# Patient Record
Sex: FEMALE | Race: WHITE | NOT HISPANIC OR LATINO | Employment: OTHER | URBAN - METROPOLITAN AREA
[De-identification: names, ages, dates, MRNs, and addresses within clinical notes are randomized per-mention and may not be internally consistent; named-entity substitution may affect disease eponyms.]

---

## 2019-12-29 ENCOUNTER — APPOINTMENT (EMERGENCY)
Dept: RADIOLOGY | Facility: HOSPITAL | Age: 84
DRG: 872 | End: 2019-12-29
Payer: MEDICARE

## 2019-12-29 ENCOUNTER — HOSPITAL ENCOUNTER (INPATIENT)
Facility: HOSPITAL | Age: 84
LOS: 9 days | Discharge: NURSING, REHAB OR LONG-TERM CARE | DRG: 872 | End: 2020-01-07
Attending: SURGERY | Admitting: HOSPITALIST
Payer: MEDICARE

## 2019-12-29 DIAGNOSIS — F02.80 LATE ONSET ALZHEIMER DISEASE (HCC): Chronic | ICD-10-CM

## 2019-12-29 DIAGNOSIS — R78.81 BACTEREMIA: ICD-10-CM

## 2019-12-29 DIAGNOSIS — R65.20 SEVERE SEPSIS (HCC): ICD-10-CM
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
## 2019-12-29 DIAGNOSIS — L21.9 SEBORRHEIC DERMATITIS OF SCALP: ICD-10-CM

## 2019-12-29 DIAGNOSIS — E44.0 PROTEIN-CALORIE MALNUTRITION, MODERATE (HCC): ICD-10-CM

## 2019-12-29 DIAGNOSIS — A41.9 SEVERE SEPSIS (HCC): ICD-10-CM

## 2019-12-29 DIAGNOSIS — E73.9 LACTOSE INTOLERANCE: Chronic | ICD-10-CM

## 2019-12-29 DIAGNOSIS — N30.00 ACUTE CYSTITIS WITHOUT HEMATURIA: Primary | ICD-10-CM

## 2019-12-29 DIAGNOSIS — N39.0 UTI (URINARY TRACT INFECTION): ICD-10-CM

## 2019-12-29 DIAGNOSIS — I10 ESSENTIAL HYPERTENSION: ICD-10-CM

## 2019-12-29 DIAGNOSIS — S31.809A WOUND OF BUTTOCK: ICD-10-CM

## 2019-12-29 DIAGNOSIS — G30.1 LATE ONSET ALZHEIMER DISEASE (HCC): Chronic | ICD-10-CM

## 2019-12-29 PROBLEM — E87.3 RESPIRATORY ALKALOSIS: Status: ACTIVE | Noted: 2019-12-29

## 2019-12-29 PROBLEM — R79.89 ELEVATED LACTIC ACID LEVEL: Status: ACTIVE | Noted: 2019-12-29

## 2019-12-29 PROBLEM — E86.0 DEHYDRATION: Status: ACTIVE | Noted: 2019-12-29

## 2019-12-29 PROBLEM — F03.90 DEMENTIA (HCC): Status: ACTIVE | Noted: 2019-12-29

## 2019-12-29 LAB
ALBUMIN SERPL BCP-MCNC: 3 G/DL (ref 3.5–5)
ALP SERPL-CCNC: 72 U/L (ref 46–116)
ALT SERPL W P-5'-P-CCNC: 20 U/L (ref 12–78)
ANION GAP SERPL CALCULATED.3IONS-SCNC: 6 MMOL/L (ref 4–13)
APTT PPP: 26 SECONDS (ref 23–37)
AST SERPL W P-5'-P-CCNC: 30 U/L (ref 5–45)
BACTERIA UR QL AUTO: ABNORMAL /HPF
BASE EXCESS BLDA CALC-SCNC: 1 MMOL/L (ref -2–3)
BASOPHILS # BLD AUTO: 0.04 THOUSANDS/ΜL (ref 0–0.1)
BASOPHILS NFR BLD AUTO: 1 % (ref 0–1)
BILIRUB SERPL-MCNC: 0.48 MG/DL (ref 0.2–1)
BILIRUB UR QL STRIP: NEGATIVE
BUN SERPL-MCNC: 28 MG/DL (ref 5–25)
CA-I BLD-SCNC: 1.23 MMOL/L (ref 1.12–1.32)
CALCIUM SERPL-MCNC: 9.7 MG/DL (ref 8.3–10.1)
CHLORIDE SERPL-SCNC: 105 MMOL/L (ref 100–108)
CLARITY UR: ABNORMAL
CO2 SERPL-SCNC: 26 MMOL/L (ref 21–32)
COLOR UR: YELLOW
CREAT SERPL-MCNC: 1.13 MG/DL (ref 0.6–1.3)
EOSINOPHIL # BLD AUTO: 0.36 THOUSAND/ΜL (ref 0–0.61)
EOSINOPHIL NFR BLD AUTO: 5 % (ref 0–6)
ERYTHROCYTE [DISTWIDTH] IN BLOOD BY AUTOMATED COUNT: 16.2 % (ref 11.6–15.1)
GFR SERPL CREATININE-BSD FRML MDRD: 42 ML/MIN/1.73SQ M
GLUCOSE SERPL-MCNC: 138 MG/DL (ref 65–140)
GLUCOSE SERPL-MCNC: 140 MG/DL (ref 65–140)
GLUCOSE UR STRIP-MCNC: NEGATIVE MG/DL
HCO3 BLDA-SCNC: 25.3 MMOL/L (ref 24–30)
HCT VFR BLD AUTO: 32.7 % (ref 34.8–46.1)
HCT VFR BLD CALC: 31 % (ref 34.8–46.1)
HGB BLD-MCNC: 10.3 G/DL (ref 11.5–15.4)
HGB BLDA-MCNC: 10.5 G/DL (ref 11.5–15.4)
HGB UR QL STRIP.AUTO: ABNORMAL
IMM GRANULOCYTES # BLD AUTO: 0.03 THOUSAND/UL (ref 0–0.2)
IMM GRANULOCYTES NFR BLD AUTO: 0 % (ref 0–2)
INR PPP: 1.04 (ref 0.84–1.19)
KETONES UR STRIP-MCNC: NEGATIVE MG/DL
LACTATE SERPL-SCNC: 1.8 MMOL/L (ref 0.5–2)
LACTATE SERPL-SCNC: 2.4 MMOL/L (ref 0.5–2)
LEUKOCYTE ESTERASE UR QL STRIP: ABNORMAL
LYMPHOCYTES # BLD AUTO: 0.87 THOUSANDS/ΜL (ref 0.6–4.47)
LYMPHOCYTES NFR BLD AUTO: 11 % (ref 14–44)
MCH RBC QN AUTO: 28 PG (ref 26.8–34.3)
MCHC RBC AUTO-ENTMCNC: 31.5 G/DL (ref 31.4–37.4)
MCV RBC AUTO: 89 FL (ref 82–98)
MONOCYTES # BLD AUTO: 0.43 THOUSAND/ΜL (ref 0.17–1.22)
MONOCYTES NFR BLD AUTO: 5 % (ref 4–12)
NEUTROPHILS # BLD AUTO: 6.17 THOUSANDS/ΜL (ref 1.85–7.62)
NEUTS SEG NFR BLD AUTO: 78 % (ref 43–75)
NITRITE UR QL STRIP: POSITIVE
NON-SQ EPI CELLS URNS QL MICRO: ABNORMAL /HPF
NRBC BLD AUTO-RTO: 0 /100 WBCS
PCO2 BLD: 26 MMOL/L (ref 21–32)
PCO2 BLD: 37.8 MM HG (ref 42–50)
PH BLD: 7.43 [PH] (ref 7.3–7.4)
PH UR STRIP.AUTO: 6.5 [PH]
PLATELET # BLD AUTO: 150 THOUSANDS/UL (ref 149–390)
PMV BLD AUTO: 10.6 FL (ref 8.9–12.7)
PO2 BLD: 26 MM HG (ref 35–45)
POTASSIUM BLD-SCNC: 4 MMOL/L (ref 3.5–5.3)
POTASSIUM SERPL-SCNC: 3.9 MMOL/L (ref 3.5–5.3)
PROCALCITONIN SERPL-MCNC: <0.05 NG/ML
PROT SERPL-MCNC: 7.8 G/DL (ref 6.4–8.2)
PROT UR STRIP-MCNC: ABNORMAL MG/DL
PROTHROMBIN TIME: 13.2 SECONDS (ref 11.6–14.5)
RBC # BLD AUTO: 3.68 MILLION/UL (ref 3.81–5.12)
RBC #/AREA URNS AUTO: ABNORMAL /HPF
SAO2 % BLD FROM PO2: 50 % (ref 60–85)
SODIUM BLD-SCNC: 138 MMOL/L (ref 136–145)
SODIUM SERPL-SCNC: 137 MMOL/L (ref 136–145)
SP GR UR STRIP.AUTO: 1.02 (ref 1–1.03)
SPECIMEN SOURCE: ABNORMAL
UROBILINOGEN UR QL STRIP.AUTO: 0.2 E.U./DL
WBC # BLD AUTO: 7.9 THOUSAND/UL (ref 4.31–10.16)
WBC #/AREA URNS AUTO: ABNORMAL /HPF

## 2019-12-29 PROCEDURE — 36415 COLL VENOUS BLD VENIPUNCTURE: CPT | Performed by: EMERGENCY MEDICINE

## 2019-12-29 PROCEDURE — 85014 HEMATOCRIT: CPT

## 2019-12-29 PROCEDURE — 84145 PROCALCITONIN (PCT): CPT | Performed by: EMERGENCY MEDICINE

## 2019-12-29 PROCEDURE — 84132 ASSAY OF SERUM POTASSIUM: CPT

## 2019-12-29 PROCEDURE — 72125 CT NECK SPINE W/O DYE: CPT

## 2019-12-29 PROCEDURE — 87086 URINE CULTURE/COLONY COUNT: CPT | Performed by: EMERGENCY MEDICINE

## 2019-12-29 PROCEDURE — 87186 SC STD MICRODIL/AGAR DIL: CPT | Performed by: EMERGENCY MEDICINE

## 2019-12-29 PROCEDURE — 80053 COMPREHEN METABOLIC PANEL: CPT | Performed by: EMERGENCY MEDICINE

## 2019-12-29 PROCEDURE — 87631 RESP VIRUS 3-5 TARGETS: CPT | Performed by: HOSPITALIST

## 2019-12-29 PROCEDURE — 99223 1ST HOSP IP/OBS HIGH 75: CPT | Performed by: HOSPITALIST

## 2019-12-29 PROCEDURE — 85730 THROMBOPLASTIN TIME PARTIAL: CPT | Performed by: EMERGENCY MEDICINE

## 2019-12-29 PROCEDURE — 1124F ACP DISCUSS-NO DSCNMKR DOCD: CPT | Performed by: INTERNAL MEDICINE

## 2019-12-29 PROCEDURE — 82330 ASSAY OF CALCIUM: CPT

## 2019-12-29 PROCEDURE — 99222 1ST HOSP IP/OBS MODERATE 55: CPT | Performed by: SURGERY

## 2019-12-29 PROCEDURE — 87077 CULTURE AEROBIC IDENTIFY: CPT | Performed by: EMERGENCY MEDICINE

## 2019-12-29 PROCEDURE — 82803 BLOOD GASES ANY COMBINATION: CPT

## 2019-12-29 PROCEDURE — 96365 THER/PROPH/DIAG IV INF INIT: CPT

## 2019-12-29 PROCEDURE — 82947 ASSAY GLUCOSE BLOOD QUANT: CPT

## 2019-12-29 PROCEDURE — 70450 CT HEAD/BRAIN W/O DYE: CPT

## 2019-12-29 PROCEDURE — 87040 BLOOD CULTURE FOR BACTERIA: CPT | Performed by: EMERGENCY MEDICINE

## 2019-12-29 PROCEDURE — 99285 EMERGENCY DEPT VISIT HI MDM: CPT

## 2019-12-29 PROCEDURE — 85610 PROTHROMBIN TIME: CPT | Performed by: EMERGENCY MEDICINE

## 2019-12-29 PROCEDURE — 84295 ASSAY OF SERUM SODIUM: CPT

## 2019-12-29 PROCEDURE — 81001 URINALYSIS AUTO W/SCOPE: CPT | Performed by: EMERGENCY MEDICINE

## 2019-12-29 PROCEDURE — 85025 COMPLETE CBC W/AUTO DIFF WBC: CPT | Performed by: EMERGENCY MEDICINE

## 2019-12-29 PROCEDURE — 83605 ASSAY OF LACTIC ACID: CPT | Performed by: EMERGENCY MEDICINE

## 2019-12-29 RX ORDER — MULTIVITAMIN WITH IRON
1 TABLET ORAL DAILY
COMMUNITY

## 2019-12-29 RX ORDER — ATENOLOL 25 MG/1
25 TABLET ORAL DAILY
COMMUNITY

## 2019-12-29 RX ORDER — ASPIRIN 81 MG/1
81 TABLET, CHEWABLE ORAL DAILY
COMMUNITY

## 2019-12-29 RX ORDER — SODIUM CHLORIDE 9 MG/ML
3 INJECTION INTRAVENOUS AS NEEDED
Status: DISCONTINUED | OUTPATIENT
Start: 2019-12-29 | End: 2020-01-07 | Stop reason: HOSPADM

## 2019-12-29 RX ORDER — LOVASTATIN 20 MG/1
20 TABLET ORAL
COMMUNITY

## 2019-12-29 RX ORDER — LOPERAMIDE HYDROCHLORIDE 2 MG/1
2 CAPSULE ORAL 4 TIMES DAILY PRN
COMMUNITY

## 2019-12-29 RX ORDER — DOCUSATE SODIUM 100 MG/1
100 CAPSULE, LIQUID FILLED ORAL 2 TIMES DAILY
COMMUNITY
End: 2020-01-07 | Stop reason: HOSPADM

## 2019-12-29 RX ADMIN — CEFEPIME HYDROCHLORIDE 2000 MG: 2 INJECTION, POWDER, FOR SOLUTION INTRAVENOUS at 21:25

## 2019-12-29 RX ADMIN — SODIUM CHLORIDE 500 ML: 0.9 INJECTION, SOLUTION INTRAVENOUS at 21:23

## 2019-12-30 PROBLEM — E73.9 LACTOSE INTOLERANCE: Chronic | Status: ACTIVE | Noted: 2019-12-30

## 2019-12-30 PROBLEM — N39.0 URINARY TRACT INFECTION: Status: ACTIVE | Noted: 2019-12-30

## 2019-12-30 PROBLEM — I16.0 HYPERTENSIVE URGENCY: Status: ACTIVE | Noted: 2019-12-29

## 2019-12-30 LAB
ANION GAP SERPL CALCULATED.3IONS-SCNC: 7 MMOL/L (ref 4–13)
BUN SERPL-MCNC: 22 MG/DL (ref 5–25)
CALCIUM SERPL-MCNC: 8.9 MG/DL (ref 8.3–10.1)
CHLORIDE SERPL-SCNC: 112 MMOL/L (ref 100–108)
CO2 SERPL-SCNC: 22 MMOL/L (ref 21–32)
CREAT SERPL-MCNC: 0.78 MG/DL (ref 0.6–1.3)
ERYTHROCYTE [DISTWIDTH] IN BLOOD BY AUTOMATED COUNT: 16.4 % (ref 11.6–15.1)
FLUAV RNA NPH QL NAA+PROBE: NORMAL
FLUBV RNA NPH QL NAA+PROBE: NORMAL
GFR SERPL CREATININE-BSD FRML MDRD: 65 ML/MIN/1.73SQ M
GLUCOSE SERPL-MCNC: 95 MG/DL (ref 65–140)
HCT VFR BLD AUTO: 30.6 % (ref 34.8–46.1)
HGB BLD-MCNC: 9.9 G/DL (ref 11.5–15.4)
MCH RBC QN AUTO: 28.7 PG (ref 26.8–34.3)
MCHC RBC AUTO-ENTMCNC: 32.4 G/DL (ref 31.4–37.4)
MCV RBC AUTO: 89 FL (ref 82–98)
PLATELET # BLD AUTO: 136 THOUSANDS/UL (ref 149–390)
PMV BLD AUTO: 11 FL (ref 8.9–12.7)
POTASSIUM SERPL-SCNC: 3.6 MMOL/L (ref 3.5–5.3)
RBC # BLD AUTO: 3.45 MILLION/UL (ref 3.81–5.12)
RSV RNA NPH QL NAA+PROBE: NORMAL
SODIUM SERPL-SCNC: 141 MMOL/L (ref 136–145)
WBC # BLD AUTO: 6.8 THOUSAND/UL (ref 4.31–10.16)

## 2019-12-30 PROCEDURE — G8978 MOBILITY CURRENT STATUS: HCPCS

## 2019-12-30 PROCEDURE — G8979 MOBILITY GOAL STATUS: HCPCS

## 2019-12-30 PROCEDURE — 99231 SBSQ HOSP IP/OBS SF/LOW 25: CPT | Performed by: PHYSICIAN ASSISTANT

## 2019-12-30 PROCEDURE — 97163 PT EVAL HIGH COMPLEX 45 MIN: CPT

## 2019-12-30 PROCEDURE — G8987 SELF CARE CURRENT STATUS: HCPCS

## 2019-12-30 PROCEDURE — 99232 SBSQ HOSP IP/OBS MODERATE 35: CPT | Performed by: INTERNAL MEDICINE

## 2019-12-30 PROCEDURE — 85027 COMPLETE CBC AUTOMATED: CPT | Performed by: HOSPITALIST

## 2019-12-30 PROCEDURE — 80048 BASIC METABOLIC PNL TOTAL CA: CPT | Performed by: HOSPITALIST

## 2019-12-30 PROCEDURE — G8988 SELF CARE GOAL STATUS: HCPCS

## 2019-12-30 PROCEDURE — 97167 OT EVAL HIGH COMPLEX 60 MIN: CPT

## 2019-12-30 RX ORDER — ASPIRIN 81 MG/1
81 TABLET, CHEWABLE ORAL DAILY
Status: DISCONTINUED | OUTPATIENT
Start: 2019-12-30 | End: 2020-01-07 | Stop reason: HOSPADM

## 2019-12-30 RX ORDER — CHOLECALCIFEROL (VITAMIN D3) 125 MCG
9000 CAPSULE ORAL
Status: DISCONTINUED | OUTPATIENT
Start: 2019-12-30 | End: 2020-01-07 | Stop reason: HOSPADM

## 2019-12-30 RX ORDER — SODIUM CHLORIDE 9 MG/ML
75 INJECTION, SOLUTION INTRAVENOUS CONTINUOUS
Status: DISPENSED | OUTPATIENT
Start: 2019-12-30 | End: 2019-12-30

## 2019-12-30 RX ORDER — MELATONIN
1000 DAILY
Status: DISCONTINUED | OUTPATIENT
Start: 2019-12-30 | End: 2020-01-07 | Stop reason: HOSPADM

## 2019-12-30 RX ORDER — HYDROCHLOROTHIAZIDE 25 MG/1
25 TABLET ORAL DAILY
COMMUNITY
Start: 2019-11-21

## 2019-12-30 RX ORDER — TIMOLOL MALEATE 5 MG/ML
1 SOLUTION/ DROPS OPHTHALMIC 2 TIMES DAILY
Status: DISCONTINUED | OUTPATIENT
Start: 2019-12-30 | End: 2020-01-07 | Stop reason: HOSPADM

## 2019-12-30 RX ORDER — POTASSIUM CHLORIDE 750 MG/1
10 TABLET, EXTENDED RELEASE ORAL DAILY
Status: DISCONTINUED | OUTPATIENT
Start: 2019-12-30 | End: 2020-01-07 | Stop reason: HOSPADM

## 2019-12-30 RX ORDER — SODIUM CHLORIDE 9 MG/ML
100 INJECTION, SOLUTION INTRAVENOUS CONTINUOUS
Status: DISCONTINUED | OUTPATIENT
Start: 2019-12-30 | End: 2019-12-30

## 2019-12-30 RX ORDER — SODIUM CHLORIDE 9 MG/ML
75 INJECTION, SOLUTION INTRAVENOUS CONTINUOUS
Status: DISCONTINUED | OUTPATIENT
Start: 2019-12-30 | End: 2019-12-31

## 2019-12-30 RX ORDER — ACETAMINOPHEN 325 MG/1
650 TABLET ORAL EVERY 6 HOURS PRN
Status: DISCONTINUED | OUTPATIENT
Start: 2019-12-30 | End: 2020-01-07 | Stop reason: HOSPADM

## 2019-12-30 RX ORDER — CHOLECALCIFEROL (VITAMIN D3) 125 MCG
6000 CAPSULE ORAL
Status: DISCONTINUED | OUTPATIENT
Start: 2019-12-30 | End: 2019-12-30

## 2019-12-30 RX ORDER — POLYETHYLENE GLYCOL 3350 17 G/17G
17 POWDER, FOR SOLUTION ORAL DAILY PRN
Status: DISCONTINUED | OUTPATIENT
Start: 2019-12-30 | End: 2020-01-01

## 2019-12-30 RX ORDER — ONDANSETRON 2 MG/ML
4 INJECTION INTRAMUSCULAR; INTRAVENOUS EVERY 6 HOURS PRN
Status: DISCONTINUED | OUTPATIENT
Start: 2019-12-30 | End: 2020-01-07 | Stop reason: HOSPADM

## 2019-12-30 RX ORDER — ATENOLOL 25 MG/1
25 TABLET ORAL DAILY
Status: DISCONTINUED | OUTPATIENT
Start: 2019-12-30 | End: 2019-12-31

## 2019-12-30 RX ORDER — LOPERAMIDE HYDROCHLORIDE 2 MG/1
2 CAPSULE ORAL 4 TIMES DAILY PRN
Status: DISCONTINUED | OUTPATIENT
Start: 2019-12-30 | End: 2020-01-07 | Stop reason: HOSPADM

## 2019-12-30 RX ORDER — PRAVASTATIN SODIUM 20 MG
20 TABLET ORAL
Status: DISCONTINUED | OUTPATIENT
Start: 2019-12-30 | End: 2020-01-07 | Stop reason: HOSPADM

## 2019-12-30 RX ORDER — HYDRALAZINE HYDROCHLORIDE 20 MG/ML
5 INJECTION INTRAMUSCULAR; INTRAVENOUS EVERY 6 HOURS PRN
Status: DISPENSED | OUTPATIENT
Start: 2019-12-30 | End: 2019-12-31

## 2019-12-30 RX ORDER — SACCHAROMYCES BOULARDII 250 MG
250 CAPSULE ORAL 2 TIMES DAILY
Status: DISCONTINUED | OUTPATIENT
Start: 2019-12-30 | End: 2020-01-07 | Stop reason: HOSPADM

## 2019-12-30 RX ADMIN — HYDRALAZINE HYDROCHLORIDE 5 MG: 20 INJECTION INTRAMUSCULAR; INTRAVENOUS at 04:26

## 2019-12-30 RX ADMIN — ATENOLOL 25 MG: 25 TABLET ORAL at 08:06

## 2019-12-30 RX ADMIN — TIMOLOL MALEATE 1 DROP: 5 SOLUTION/ DROPS OPHTHALMIC at 08:07

## 2019-12-30 RX ADMIN — SODIUM CHLORIDE 100 ML/HR: 0.9 INJECTION, SOLUTION INTRAVENOUS at 04:26

## 2019-12-30 RX ADMIN — SODIUM CHLORIDE 75 ML/HR: 0.9 INJECTION, SOLUTION INTRAVENOUS at 17:07

## 2019-12-30 RX ADMIN — ZINC 1 TABLET: TAB ORAL at 18:06

## 2019-12-30 RX ADMIN — Medication 250 MG: at 18:06

## 2019-12-30 RX ADMIN — POTASSIUM CHLORIDE 10 MEQ: 750 TABLET, EXTENDED RELEASE ORAL at 08:06

## 2019-12-30 RX ADMIN — ACETAMINOPHEN 650 MG: 325 TABLET ORAL at 16:02

## 2019-12-30 RX ADMIN — SODIUM CHLORIDE 1000 ML: 0.9 INJECTION, SOLUTION INTRAVENOUS at 00:51

## 2019-12-30 RX ADMIN — CEFTRIAXONE 1000 MG: 1 INJECTION, POWDER, FOR SOLUTION INTRAMUSCULAR; INTRAVENOUS at 06:42

## 2019-12-30 RX ADMIN — LACTASE TAB 3000 UNIT 9000 UNITS: 3000 TAB at 18:06

## 2019-12-30 RX ADMIN — ASPIRIN 81 MG 81 MG: 81 TABLET ORAL at 08:06

## 2019-12-30 RX ADMIN — ENOXAPARIN SODIUM 30 MG: 30 INJECTION SUBCUTANEOUS at 08:07

## 2019-12-30 RX ADMIN — MELATONIN 1000 UNITS: at 08:06

## 2019-12-30 RX ADMIN — TIMOLOL MALEATE 1 DROP: 5 SOLUTION/ DROPS OPHTHALMIC at 18:06

## 2019-12-30 RX ADMIN — PRAVASTATIN SODIUM 20 MG: 20 TABLET ORAL at 18:06

## 2019-12-30 NOTE — PLAN OF CARE
Problem: OCCUPATIONAL THERAPY ADULT  Goal: Performs self-care activities at highest level of function for planned discharge setting  See evaluation for individualized goals  Description  Treatment Interventions: ADL retraining, Functional transfer training, Endurance training, Cognitive reorientation, Patient/family training, Compensatory technique education, Energy conservation, Activityengagement          See flowsheet documentation for full assessment, interventions and recommendations  Note:   Limitation: Decreased ADL status, Decreased UE ROM, Decreased UE strength, Decreased endurance, Decreased cognition, Decreased self-care trans, Decreased high-level ADLs     Assessment: Pt is a 80 y o  female seen for OT evaluation s/p admit to [facility] on 12/29/2019 w/ Severe sepsis ([address] )  She presented to the ED following a fall with + head strike at the [facility] where she lives  Comorbidities affecting pt's functional performance at time of assessment include: late onset Alzheimers disease, dehydration, UTI  Personal factors affecting pt at time of IE include:difficulty performing ADLS, difficulty performing IADLS , flat affect and decreased initiation and engagement   Prior to admission, pt was living at [facility] requiring assist w self care and functional mobility  She reports being able to toilet herself, assist w bathing and dressing  Uses w/c for hallway mobility, RW in room and bathroom  Reports 3 falls  Rebecca Sequin Upon evaluation: Pt requires mod/max assist w self care, mobility w decreased balance sitting and standing, decreased activity tolerance 2* the following deficits impacting occupational performance: weakness, decreased strength, decreased balance, decreased tolerance and impaired problem solving  Pt to benefit from continued skilled OT tx while in the hospital to address deficits as defined above and maximize level of functional independence w ADL's and functional mobility   Occupational Performance areas to address include: toilet hygiene, functional mobility and clothing management  Pt scored  30 /100 on the barthel index  Based on findings from OT evaluation and functional performance deficits, pt has been identified as a  High complexity evaluation  From OT standpoint, recommendation at time of d/c would be back to [facility] with home therapies as appropriate  OT Discharge Recommendation: Other (Comment)(back to custodial with home therapies as needed)  OT - OK to Discharge:  Yes

## 2019-12-30 NOTE — PROGRESS NOTES
Progress Note - Ryan Lr 9/20/1925, 80 y o  female MRN: [id number]    Unit/Bed#: [facility] 715-01 Encounter: [id number]    Primary Care Provider: No primary care provider on file  Date and time admitted to hospital: 12/29/2019  6:52 PM        Fall  Assessment & Plan  -tertiary exam completed today  -CTH and neck negative  -no injuries  -PT/OT evaluations  -rest of care per medicine  -trauma to sign off at this time, please reach out with any questions or concerns  -recommend geriatric consult      Trauma to sign off, please reach out with any questions or concerns  Bedside nurse rounds completed with nurse Clemens  Prophylaxis: Sequential compression device (Venodyne)  and Enoxaparin (Lovenox)    Disposition: Trauma to sign off, rest of care per medical team     Code status:  Level 3 - DNAR and DNI    Consultants: none    Is the patient 72 years or older?: YES:    1  Before the illness or injury that brought you to the Emergency, did you need someone to help you on a regular basis? 1=Yes   2  Since the illness or injury that brought you to the Emergency, have you needed more help than usual to take care of yourself? 1=Yes   3  Have you been hospitalized for one or more nights during the past 6 months (excluding a stay in the Emergency Department)? 0=No   4  In general, do you see well? 0=Yes   5  In general, do you have serious problems with your memory? 1=Yes   6  Do you take more than three different medications everyday? 1=Yes   TOTAL   4     Did you order a geriatric consult if the score was 2 or greater?: Recommendation to primary team for geriatric consult  SUBJECTIVE:     Transfer from: n/a  Outside Films Received: not applicable  Tertiary Exam Due on: 12/30/2019    Mechanism of Injury:Fall    Details related to Injury: +LOC:  no    Chief Complaint: "I feel good"    HPI/Last 24 hour events:  Patient is poor historian due to dementia but states that she is not in any pain  No new complaints  No concerns per nursing  Active medications:           Current Facility-Administered Medications:     acetaminophen (TYLENOL) tablet 650 mg, 650 mg, Oral, Q6H PRN    aspirin chewable tablet 81 mg, 81 mg, Oral, Daily, 81 mg at 12/30/19 0806    atenolol (TENORMIN) tablet 25 mg, 25 mg, Oral, Daily, 25 mg at 12/30/19 0806    cefTRIAXone (ROCEPHIN) 1,000 mg in dextrose 5 % 50 mL IVPB, 1,000 mg, Intravenous, Q24H, 1,000 mg at 12/30/19 9005    cholecalciferol (VITAMIN D3) tablet 1,000 Units, 1,000 Units, Oral, Daily, 1,000 Units at 12/30/19 0806    enoxaparin (LOVENOX) subcutaneous injection 30 mg, 30 mg, Subcutaneous, Daily, 30 mg at 12/30/19 0807    hydrALAZINE (APRESOLINE) injection 5 mg, 5 mg, Intravenous, Q6H PRN, 5 mg at 12/30/19 0426    loperamide (IMODIUM) capsule 2 mg, 2 mg, Oral, 4x Daily PRN    multivitamin stress formula tablet 1 tablet, 1 tablet, Oral, Daily    ondansetron (ZOFRAN) injection 4 mg, 4 mg, Intravenous, Q6H PRN    polyethylene glycol (MIRALAX) packet 17 g, 17 g, Oral, Daily PRN    potassium chloride (K-DUR,KLOR-CON) CR tablet 10 mEq, 10 mEq, Oral, Daily, 10 mEq at 12/30/19 0806    pravastatin (PRAVACHOL) tablet 20 mg, 20 mg, Oral, Daily With Dinner    sodium chloride (PF) 0 9 % injection 3 mL, 3 mL, Intravenous, PRN    sodium chloride 0 9 % infusion, 100 mL/hr, Intravenous, Continuous, 100 mL/hr at 12/30/19 0426    timolol (TIMOPTIC) 0 5 % ophthalmic solution 1 drop, 1 drop, Both Eyes, BID, 1 drop at 12/30/19 0807      OBJECTIVE:     Vitals:   Vitals:    12/30/19 0746   BP: (!) 136/49   Pulse: 73   Resp:    Temp:    SpO2: 97%       Physical Exam:   GENERAL APPEARANCE: WDWN elderly female in NAD resting comfortably in bed  NEURO: alert and oriented x 1 (self only)  HEENT: Atraumatic and normocephalic  CV: RRR no MRG  LUNGS: CTA b/l  GI: soft, ntnd   +BS x 4    : no folet  MSK: Moving all extremities  SKIN: warm and dry      I/O:   I/O       12/28 0701 - 12/29 0700 12/29 0701 - 12/30 0700 12/30 0701 - 12/31 0700    IV Piggyback  1000     Total Intake(mL/kg)  1000 (21)     Urine (mL/kg/hr)   82 (0 6)    Stool   0    Total Output   82    Net  +1000 -82           Unmeasured Urine Occurrence   1 x    Unmeasured Stool Occurrence   1 x          Invasive Devices: Invasive Devices     Peripheral Intravenous Line            Peripheral IV 12/29/19 Left Forearm less than 1 day                  Imaging:   Ct Head Without Contrast    Result Date: 12/29/2019  Impression: No acute intracranial abnormality  I personally discussed this study with Dr Stefany Copeland on 12/29/2019 at 7:33 PM  Workstation performed: [id number]     Ct Spine Cervical Without Contrast    Result Date: 12/29/2019  Impression: No cervical spine fracture or traumatic malalignment  Workstation performed: [id number]     Xr Trauma Multiple    Result Date: 12/30/2019  Impression: No acute cardiopulmonary disease within limitations of supine imaging  No acute bony abnormality in the left knee   Workstation performed: [id number]       Labs:   CBC:   Lab Results   Component Value Date    WBC 6 80 12/30/2019    HGB 9 9 (L) 12/30/2019    HCT 30 6 (L) 12/30/2019    MCV 89 12/30/2019     (L) 12/30/2019    MCH 28 7 12/30/2019    MCHC 32 4 12/30/2019    RDW 16 4 (H) 12/30/2019    MPV 11 0 12/30/2019    NRBC 0 12/29/2019     CMP:   Lab Results   Component Value Date     (H) 12/30/2019    CO2 22 12/30/2019    CO2 26 12/29/2019    BUN 22 12/30/2019    CREATININE 0 78 12/30/2019    GLUCOSE 140 12/29/2019    CALCIUM 8 9 12/30/2019    AST 30 12/29/2019    ALT 20 12/29/2019    ALKPHOS 72 12/29/2019    EGFR 65 12/30/2019         Jacqui Bran PA-C

## 2019-12-30 NOTE — ASSESSMENT & PLAN NOTE
Blood pressure on admission 194/100, dropped after fever spike  Will continue to monitor on telemetry  Continue home meds with holding parameter

## 2019-12-30 NOTE — ASSESSMENT & PLAN NOTE
Originally presented status post fall  Trauma workup negative  Patient found to have a severe sepsis secondary to urinary tract infection  Will treat main cause  Place PT OT evaluation  Fall precautions  Admit to telemetry to rule out arrhythmia

## 2019-12-30 NOTE — ASSESSMENT & PLAN NOTE
· Most likely secondary to dehydration and #1     · Lactic acid trended down to 1 8 with IV fluids  · Will continue IV fluids for another day, consider discontinue tomorrow morning

## 2019-12-30 NOTE — ASSESSMENT & PLAN NOTE
Most likely secondary to dehydration and #1     Lactic acid trended down to 1 8 with IV fluids  Will continue overnight hydration

## 2019-12-30 NOTE — PLAN OF CARE
Problem: PHYSICAL THERAPY ADULT  Goal: Performs mobility at highest level of function for planned discharge setting  See evaluation for individualized goals  Description  Treatment/Interventions: Functional transfer training, LE strengthening/ROM, Endurance training, Therapeutic exercise, Patient/family training, Equipment eval/education, Bed mobility, Gait training, Spoke to nursing  Equipment Recommended: Aicha Rice, Wheelchair       See flowsheet documentation for full assessment, interventions and recommendations  Note:   Prognosis: Fair  Problem List: Decreased strength, Impaired balance, Decreased range of motion, Decreased endurance, Decreased mobility, Decreased cognition, Impaired judgement, Decreased safety awareness  Assessment: Pt seen for high complexity PT evaluation due to decrease in functional mobility status compared to baseline  Pt with active PT eval/treat orders at this time  Pt is a 80 y o  yo F who presented to [facility] following a fall with head strike on 12/29/19  Pt  has no past medical history on file  Pt resides at [facility] and reports A with ADL and functional mobility PTA  Pt presents with decreased strength, balance, endurance, as well as impaired cognition and fear of movement that contribute to limitations in bed mobility, functional transfers, functional mobility  Pt requires Mod A x 1 for bed mobility, STS, and Max A x 1 for ambulation 3 ft with RW at this time  Pt left upright in bedside chair with chair alarm intact and with all needs in reach  Pt will benefit from skilled therapy in order to address current impairments and functional limitations  PT to follow pt and recommending return to facility with appropriate support and PT  Recommendation: Other (Comment)(return to factility with PT)     PT - OK to Discharge: Yes(once medically cleared)    See flowsheet documentation for full assessment

## 2019-12-30 NOTE — OCCUPATIONAL THERAPY NOTE
Occupational Therapy Evaluation      Maria Dolores Wolfgang    12/30/2019    Principal Problem:    Severe sepsis ([address] )  Active Problems:    Elevated lactic acid level    Hypertensive urgency    Protein-calorie malnutrition, moderate (HCC)    Fall    Urinary tract infection    Lactose intolerance      History reviewed  No pertinent past medical history  History reviewed  No pertinent surgical history  12/30/19 1145   Note Type   Note type Eval only   Restrictions/Precautions   Weight Bearing Precautions Per Order No   Other Precautions Cognitive; Chair Alarm; Bed Alarm;Multiple lines; Fall Risk   Pain Assessment   Pain Assessment No/denies pain   Pain Score No Pain   Home Living   Type of Home Assisted living   Home Layout One level   Bathroom Shower/Tub Walk-in shower   Bathroom Toilet Standard   Bathroom Equipment Grab bars in shower; Shower chair;Grab bars around toilet   [facility]; Wheelchair-manual   Additional Comments uses w/c for hallway distances, RW in room/bathrrom   Prior Function   Level of [place] Needs assistance with ADLs and functional mobility   Lives With Facility staff   Receives Help From Personal care attendant   ADL Assistance Needs assistance   IADLs Needs assistance   Falls in the last 6 months 1 to 4   Comments pt reports 3 falls  Lifestyle   Autonomy pt reports sleeping in a recliner chair   assisted with bathing, dressing, toileting and transfers   Reciprocal Relationships supportive staff, supportive daughter   [address] read books   Psychosocial   Psychosocial (WDL) WDL   ADL   Eating Assistance 3  Moderate Assistance   Eating Deficit Bringing food to mouth assist  (due to decreased ROM UE's, set up)   Grooming Assistance 3  Moderate Assistance   UB Bathing Assistance 3  Moderate Assistance   LB Bathing Assistance 2  Maximal Assistance   [address] 3  Moderate Assistance   LB Dressing Assistance 2  Maximal Assistance   Bed Mobility   Supine to Sit 3  Moderate assistance   Additional items Increased time required;HOB elevated;Verbal cues   Transfers   Sit to Stand 3  Moderate assistance   Additional items Assist x 1; Increased time required;Verbal cues   Stand to Sit 3  Moderate assistance   Stand pivot 2  Maximal assistance   Additional items Assist x 1; Increased time required;Verbal cues   Balance   Static Sitting Fair   Dynamic Sitting Poor +   Static Standing Poor -   Dynamic Standing Poor -   Activity Tolerance   Activity Tolerance Patient limited by fatigue   Medical Staff Made Aware PT Geovanna Hilton   Nurse Made Aware appropriate to see per RN   RUE Assessment   RUE Assessment X   RUE Overall AROM   R Shoulder Flexion 30*   LUE Assessment   LUE Assessment X   LUE Overall AROM   L Shoulder Flexion 25*   Hand Function   Gross Motor Coordination Impaired   Fine Motor Coordination Functional   Vision - Complex Assessment   Tracking Able to track stimulus in all quads without difficulty   Cognition   Overall Cognitive Status Impaired   Arousal/Participation Cooperative   Attention Attends with cues to redirect   Orientation Level Oriented to person;Oriented to place;Oriented to situation;Disoriented to time   Memory Decreased recall of recent events;Decreased short term memory   Following Commands Follows one step commands without difficulty   Comments pt with baseline alzheimers   Assessment   Limitation Decreased ADL status; Decreased UE ROM; Decreased UE strength;Decreased endurance;Decreased cognition;Decreased self-care trans;Decreased high-level ADLs   Assessment Pt is a 80 y o  female seen for OT evaluation s/p admit to [facility] on 12/29/2019 w/ Severe sepsis ([address] )  She presented to the ED following a fall with + head strike at the [facility] where she lives  Comorbidities affecting pt's functional performance at time of assessment include: late onset Alzheimers disease, dehydration, UTI   Personal factors affecting pt at time of IE include:difficulty performing ADLS, difficulty performing IADLS , flat affect and decreased initiation and engagement   Prior to admission, pt was living at [facility] requiring assist w self care and functional mobility  She reports being able to toilet herself, assist w bathing and dressing  Uses w/c for hallway mobility, RW in room and bathroom  Reports 3 falls  Emelia Singh Upon evaluation: Pt requires mod/max assist w self care, mobility w decreased balance sitting and standing, decreased activity tolerance 2* the following deficits impacting occupational performance: weakness, decreased strength, decreased balance, decreased tolerance and impaired problem solving  Pt to benefit from continued skilled OT tx while in the hospital to address deficits as defined above and maximize level of functional independence w ADL's and functional mobility  Occupational Performance areas to address include: toilet hygiene, functional mobility and clothing management  Pt scored  30 /100 on the barthel index  Based on findings from OT evaluation and functional performance deficits, pt has been identified as a  High complexity evaluation  From OT standpoint, recommendation at time of d/c would be back to [facility] with home therapies as appropriate  Goals   Patient Goals to sit up in the chair   STG Time Frame 3-5   Short Term Goal #1 pt will complete bedmobility w min assist   Short Term Goal #2 improve sitting balance at EOB X 5 min to fair +   LTG Time Frame 10-14   Long Term Goal #1 tolerate standing x 5 min w fair balance for increased safety w hygiene (use of RW if needed)   Long Term Goal #2 functional transfers w mod assist/use of AD/ good safety    Plan   Treatment Interventions ADL retraining;Functional transfer training; Endurance training;Cognitive reorientation;Patient/family training; Compensatory technique education; Energy conservation; Activityengagement   Goal Expiration Date 01/13/20   OT Frequency 2-3x/wk   Recommendation   OT Discharge Recommendation Other (Comment)  (back to [facility] with home therapies as needed)   OT - OK to Discharge Yes   Barthel Index   Feeding 5   Bathing 0   Grooming Score 0   Dressing Score 0   Bladder Score 5   Bowels Score 10   Toilet Use Score 5   Transfers (Bed/Chair) Score 5   Mobility (Level Surface) Score 0   Stairs Score 0   Barthel Index Score 30

## 2019-12-30 NOTE — ASSESSMENT & PLAN NOTE
BUN 28 creatinine 1 13, BUN to creatinine ratio above 20  Will provide IV hydration overnight re-evaluate need of fluids in a m  Repeat BMP in a m

## 2019-12-30 NOTE — H&P
H&P Exam - Trauma   Erinn Campbell 80 y o  female MRN: [id number]  Unit/Bed#: TR 02 Encounter: [id number]    Assessment/Plan   Trauma Alert: Level B  Model of Arrival: Ambulance  Trauma Team: Attending Hai Roth and Residents Radha  Consultants: None    Trauma Active Problems:   Fall  Fever     Trauma Plan:   Fall  - CT H/Cspine: Negative  - CXR: no fx or ptx on wet read  - trauma will f/u with tertiary survey    Fever  - may be contributing to falls  - infectious workup  - medicine admission for further management    Chief Complaint: Fall    History of Present Illness   HPI:  Erinn aCmpbell is a 80 y o  female who presents following a fall from ground level with positive head strike  Patient takes a daily baby aspirin  No loss of consciousness  Patient arrives alert, denies any acute pain or injuries although she does endorse some left knee pain which she has reports of a chronic issue  Denies any neck pain, weakness, numbness  No other complaints  Mechanism:Fall    Review of Systems   Constitutional: Positive for fever  Negative for diaphoresis  HENT: Negative for rhinorrhea and sore throat  Eyes: Negative for photophobia and visual disturbance  Respiratory: Negative for cough and shortness of breath  Cardiovascular: Negative for chest pain and palpitations  Gastrointestinal: Negative for abdominal pain, blood in stool, diarrhea, nausea and vomiting  Genitourinary: Negative for dysuria and hematuria  Musculoskeletal: Negative for neck pain and neck stiffness  Skin: Negative for rash and wound  Neurological: Negative for weakness and numbness  Psychiatric/Behavioral: Negative for agitation and confusion  All other systems reviewed and are negative  12-point, complete review of systems was reviewed and negative except as stated above  Historical Information     History reviewed  No pertinent past medical history  History reviewed  No pertinent surgical history    Social History   Social History     Substance and Sexual Activity   Alcohol Use Not Currently     Social History     Substance and Sexual Activity   Drug Use Never     Social History     Tobacco Use   Smoking Status Unknown If Ever Smoked   Smokeless Tobacco Never Used       There is no immunization history on file for this patient  Family History: Non-contributory      Meds/Allergies   all current active meds have been reviewed    Allergies   Allergen Reactions    Iodine     Penicillins          PHYSICAL EXAM      Objective   Vitals:   First set: Temperature: (!) 101 °F (38 3 °C) (12/29/19 1858)  Pulse: 78 (12/29/19 1858)  Respirations: 18 (12/29/19 1858)  Blood Pressure: (!) 188/80 (12/29/19 1858)    Primary Survey:   (A) Airway: patent  (B) Breathing: BL breath sounds  (C) Circulation: Pulses:   normal  (D) Disabliity:  GCS Total:  15  (E) Expose:  Completed    Secondary Survey: (Click on Physical Exam tab above)  Physical Exam   Constitutional: She is oriented to person, place, and time  She appears well-developed  No distress  HENT:   Head: Normocephalic  Right Ear: External ear normal    Left Ear: External ear normal    Nose: Nose normal    Mouth/Throat: Oropharynx is clear and moist    Eyes: Pupils are equal, round, and reactive to light  Conjunctivae and EOM are normal    Neck: No tracheal deviation present  Cardiovascular: Normal rate, normal heart sounds and intact distal pulses  Pulmonary/Chest: Effort normal and breath sounds normal  No stridor  No respiratory distress  She has no wheezes  She has no rales  She exhibits no tenderness  Abdominal: Soft  She exhibits no distension  There is no tenderness  There is no rebound and no guarding     Musculoskeletal: She exhibits no deformity    - C/ - T/ - L spine tenderness     No Abrasions    LUE: No [place] Point Tenderness, Compartments Soft, No effusion,No Deformity, Full ROM, Intact motor, Intact Distal Sensation, Intact Pincer Grasp  RUE: No [place] Tenderness, Compartments Soft, No effusion, No Deformity, Full ROM, Intact motor, Intact Distal Sensation, Intact Pincer Grasp  LLE: No [place] Point Tenderness, Compartments Soft, No effusion, No Deformity  Mild pain with range of motion of left knee, crepitus to palpation of left knee  Intact motor, Intact Distal Sensation  RLE: No [place] Point Tenderness, Compartments Soft, No effusion, No Deformity  Full ROM, Intact motor, Intact Distal Sensation   Neurological: She is alert and oriented to person, place, and time  No cranial nerve deficit or sensory deficit  She exhibits normal muscle tone  CN II-XII intact    LUE: 5/5 motor strength, intact distal sensation  RUE: 5/5 motor strength, intact distal sensation  LLE: 5/5 motor strength, intact distal sensation  RLE: 5/5 motor strength, intact distal sensation     Skin: Skin is warm and dry  Capillary refill takes less than 2 seconds  She is not diaphoretic  Psychiatric: She has a normal mood and affect  Her behavior is normal    Nursing note and vitals reviewed  Invasive Devices     Peripheral Intravenous Line            Peripheral IV 12/29/19 Left Forearm less than 1 day                Lab Results: Results: I have personally reviewed pertinent reports  Imaging/EKG Studies: Results: I have personally reviewed pertinent reports    , FAST: negative  Other Studies: n/a    Code Status: No Order  Advance Directive and Living Will:      Power of :    POLST:

## 2019-12-30 NOTE — ASSESSMENT & PLAN NOTE
· Patient presented from nursing home s/p mechanical fall, trauma cleared, imaging negative  · Incidentally found to have a positive sepsis criteria of temperature maximum 38 3 C respiratory 28 lactic acid 2 4 on admission  · Urinalysis was grossly positive for pyuria bacteriuria nitrate most likely the source of infection is UTI  Continue IV ceftriaxone for now   · Follow-up urine culture, follow-up blood cultures  · influenza and RSV PCR negative  · Continue IV fluids  · PT/OT input appreciated  · Per discussion between POA and ER--patient is DNR Abraham Ramirez is daughter Tauna Cranker [phone]    No invasive intervention or blood transfusions

## 2019-12-30 NOTE — ASSESSMENT & PLAN NOTE
· Originally presented status post fall, seems mechanical based on report, patient unable to provide additional history regarding this  · Trauma workup negative  · Patient found to have a severe sepsis secondary to urinary tract infection  · PT/OT, may require higher level of care discharge  · Telemetry without any arrhythmias, will discontinue

## 2019-12-30 NOTE — ASSESSMENT & PLAN NOTE
-tertiary exam completed today  -CTH and neck negative  -no injuries  -PT/OT evaluations  -rest of care per medicine  -trauma to sign off at this time, please reach out with any questions or concerns  -recommend geriatric consult

## 2019-12-30 NOTE — ASSESSMENT & PLAN NOTE
Patient presented from nursing home status post fall found to have a positive sepsis criteria of temperature maximum 38 3 C respiratory 28 lactic acid 2 4 on admission  Urinalysis was grossly positive for pyuria bacteriuria nitrate most likely the source of infection is UTI  Will start ceftriaxone  Deescalate pending culture report  Also check influenza and RSV PCR  Continue IV fluids Tylenol antiemetic  Monitor vital signs and WBC trend  Fall and aspiration precautions  PT OT evaluation  Per discussion between POA and ER fellow patient is DNR Viktoriyajessica Tatum is daughter Patrizia Petit [phone]    No invasive intervention or blood transfusions

## 2019-12-30 NOTE — ASSESSMENT & PLAN NOTE
Malnutrition Findings:        muscle wasting    BMI Findings: There is no height or weight on file to calculate BMI     Patient is 47 7 kilos, low albumin  Nutritionist consult

## 2019-12-30 NOTE — PHYSICAL THERAPY NOTE
Physical Therapy Evaluation     Patient's Name: Iqra Alfred    Admitting Diagnosis  UTI (urinary tract infection) [N39 0]  Late onset Alzheimer disease ([facility] [address] ) [G30 1, F02 80]  Protein-calorie malnutrition, moderate ([facility] [address] ) [E44 0]  Injury, unspecified, initial encounter [T14 90XA]  Severe sepsis ([facility] [address] ) [A41 9, R65 20]  Unspecified multiple injuries, initial encounter [T07  XXXA]    Problem List  Patient Active Problem List   Diagnosis    Severe sepsis (HCC)    Elevated lactic acid level    Hypertensive urgency    Protein-calorie malnutrition, moderate ([facility] [address] )    Fall    Urinary tract infection    Lactose intolerance       Past Medical History  History reviewed  No pertinent past medical history  Past Surgical History  History reviewed  No pertinent surgical history  12/30/19 1147   Note Type   Note type Eval only   Pain Assessment   Pain Assessment No/denies pain   Pain Score No Pain   Home Living   Type of Home Assisted living  [facility])   Home Equipment Walker; Wheelchair-manual   Additional Comments Dtr reports pt uses RW in room and WC in hallway   Prior Function   Level of [place] Needs assistance with ADLs and functional mobility; Needs assistance with IADLs   Lives With Facility staff   Receives Help From Personal care attendant   ADL Assistance Needs assistance   IADLs Needs assistance   Falls in the last 6 months 1 to 4  (pt reports 3)   Restrictions/Precautions   Weight Bearing Precautions Per Order No   Braces or Orthoses   (none)   Other Precautions Cognitive; Chair Alarm; Bed Alarm;Multiple lines; Fall Risk   General   Family/Caregiver Present Yes   Cognition   Overall Cognitive Status Impaired   Arousal/Participation Responsive   Attention Attends with cues to redirect   Orientation Level Oriented to place;Oriented to person;Oriented to situation;Disoriented to time   Memory Decreased recall of precautions;Decreased recall of recent events;Decreased short term memory;Decreased recall of biographical information   Following Commands Follows one step commands with increased time or repetition   Comments Pt appears to be functioning at baseline cognition, hx of alzheimers  RLE Assessment   RLE Assessment   (functionally 2/5)   LLE Assessment   LLE Assessment   (functionally 2/5)   Bed Mobility   Supine to Sit 3  Moderate assistance   Additional items Assist x 1; Increased time required;Verbal cues;LE management;HOB elevated   Additional Comments Supine in bed upon PT arrival   Pt left upright in bedside chair with chair alarm intact and all needs in reach at end of therapy session  Transfers   Sit to Stand 3  Moderate assistance   Additional items Assist x 1; Increased time required;Verbal cues   Stand to Sit 3  Moderate assistance   Additional items Assist x 1; Increased time required;Verbal cues   Ambulation/Elevation   Gait pattern Step to;Short stride; Foward flexed; Redundant gait at times; Shuffling;Narrow ARMANDO; Decreased foot clearance   Gait Assistance 2  Maximal assist   Additional items Assist x 1; Tactile cues; Verbal cues   Assistive Device Rolling walker   Distance 3 ft from bed to chair   Stair Management Assistance Not tested   Balance   Static Sitting Fair -   Dynamic Sitting Poor +   Static Standing Poor -   Dynamic Standing Poor -   Ambulatory Poor -   Endurance Deficit   Endurance Deficit Yes   Endurance Deficit Description fatigue, weakness, cognition   Activity Tolerance   Activity Tolerance Patient limited by fatigue   Medical Staff [address]    Nurse Made Aware RN cleared pt to be seen by PT   Assessment   Prognosis Fair   Problem List Decreased strength; Impaired balance;Decreased range of motion;Decreased endurance;Decreased mobility; Decreased cognition; Impaired judgement;Decreased safety awareness   Assessment Pt seen for high complexity PT evaluation due to decrease in functional mobility status compared to baseline  Pt with active PT eval/treat orders at this time    Pt is a 80 y o  yo F who presented to [facility] following a fall with head strike on 12/29/19  Pt  has no past medical history on file  Pt resides at [facility] and reports A with ADL and functional mobility PTA  Pt presents with decreased strength, balance, endurance, as well as impaired cognition and fear of movement that contribute to limitations in bed mobility, functional transfers, functional mobility  Pt requires Mod A x 1 for bed mobility, STS, and Max A x 1 for ambulation 3 ft with RW at this time  Pt left upright in bedside chair with chair alarm intact and with all needs in reach  Pt will benefit from skilled therapy in order to address current impairments and functional limitations  PT to follow pt and recommending return to facility with appropriate support and PT  Goals   Patient Goals to sit in chair to eat   STG Expiration Date 01/13/20   Short Term Goal #1 1  Pt will demonstrate ability to perform all aspects of bed mobility with Min A in order to increase independence and decrease burden on caregivers  2  Pt will demonstrate ability to perform functional transfers with Mind A in order to increase independence and decrease burden on caregivers  3  Pt will demonstrate ability to ambulate 25 ft with least restrictive AD with Min A in order to return to mobility safely  4  Pt will demonstrate improved balance by one grade order to decrease risk of falls  5  Pt will increase b/l LE strength by 1 grade in order to increase ease of functional mobility and transfers  Plan   Treatment/Interventions Functional transfer training;LE strengthening/ROM; Endurance training; Therapeutic exercise;Patient/family training;Equipment eval/education; Bed mobility;Gait training;Spoke to nursing   PT Frequency Other (Comment)  (3-5x/wk)   Recommendation   Recommendation Other (Comment)  (return to factility with PT)   Equipment Recommended Elchristian Hernández; Wheelchair   PT - OK to Discharge Yes  (once medically cleared)   Modified [place] Scale   Modified Nael Scale 4   Barthel Index   Feeding 5   Bathing 0   Grooming Score 0   Dressing Score 0   Bladder Score 5   Bowels Score 10   Toilet Use Score 5   Transfers (Bed/Chair) Score 5   Mobility (Level Surface) Score 0   Stairs Score 0   Barthel Index Score 30         Ambika Cleveland, PT, DPT

## 2019-12-30 NOTE — ASSESSMENT & PLAN NOTE
Malnutrition Findings:        muscle wasting    BMI Findings: There is no height or weight on file to calculate BMI     · Patient is 47 7 kilos, low albumin  · Nutritionist consult

## 2019-12-30 NOTE — TRAUMA DOCUMENTATION
Contact made with pt's ROJAS Vincent Sher (daughter) [phone]  Made aware of plan for admission, she is agreeable  Reports pt is DNR/DNI and does not want invasive treatments or blood transfusions

## 2019-12-30 NOTE — SOCIAL WORK
CM met with patient and daughter Caio at bedside  Patient lives at [facility]  Patient receives assistance w/ ADLs, does not drive and uses a RW and w/c  Patient's dgt denies other hx of STR, HHC, MH or drugs/etoh IP treatment  Patient receives her medications from the [facility]  PCP is DAWN XIE is dgt in [facility] [phone]  CM reviewed d/c planning process including the following: identifying help at home, patient preference for d/c planning needs, Discharge Lounge, [facility] Meds to Bed program, availability of treatment team to discuss questions or concerns patient and/or family may have regarding understanding medications and recognizing signs and symptoms once discharged  CM also encouraged patient to follow up with all recommended appointments after discharge  Patient advised of importance for patient and family to participate in managing patients medical well being

## 2019-12-30 NOTE — H&P
H&P- Lyn Rodriguez 9/20/1925, 80 y o  female MRN: [id number]    Unit/Bed#: ED 16 Encounter: [id number]    Primary Care Provider: No primary care provider on file  Date and time admitted to hospital: 12/29/2019  6:52 PM        * Severe sepsis [facility])  Assessment & Plan  Patient presented from nursing home status post fall found to have a positive sepsis criteria of temperature maximum 38 3 C respiratory 28 lactic acid 2 4 on admission  Urinalysis was grossly positive for pyuria bacteriuria nitrate most likely the source of infection is UTI  Will start ceftriaxone  Deescalate pending culture report  Also check influenza and RSV PCR  Continue IV fluids Tylenol antiemetic  Monitor vital signs and WBC trend  Fall and aspiration precautions  PT OT evaluation  Per discussion between POA and ER fellow patient is DNR Jacinto smith daughter Ry Slipper [phone]  No invasive intervention or blood transfusions      Dehydration  Assessment & Plan  BUN 28 creatinine 1 13, BUN to creatinine ratio above 20  Will provide IV hydration overnight re-evaluate need of fluids in a m  Repeat BMP in a m  Respiratory alkalosis  Assessment & Plan  Most likely secondary to fever and increased RR  Chest x-ray unremarkable  Treat main cause    Elevated lactic acid level  Assessment & Plan  Most likely secondary to dehydration and #1  Lactic acid trended down to 1 8 with IV fluids  Will continue overnight hydration    Accelerated hypertension  Assessment & Plan  Blood pressure on admission 194/100, dropped after fever spike  Will continue to monitor on telemetry  Continue home meds with holding parameter    Late onset Alzheimer disease [facility])  Assessment & Plan  Fall and aspiration precautions  PT OT evaluation    Protein-calorie malnutrition, moderate ([address] )  Assessment & Plan  Malnutrition Findings:        muscle wasting    BMI Findings: There is no height or weight on file to calculate BMI     Patient is 47 7 kilos, low albumin  Nutritionist consult      Fall  Assessment & Plan  Originally presented status post fall  Trauma workup negative  Patient found to have a severe sepsis secondary to urinary tract infection  Will treat main cause  Place PT OT evaluation  Fall precautions  Admit to telemetry to rule out arrhythmia        VTE Prophylaxis: Enoxaparin (Lovenox)  / sequential compression device   Code Status: DNR/DNI  POLST: There is no POLST form on file for this patient (pre-hospital)  Discussion with family:  No family members at bedside    Anticipated Length of Stay:  Patient will be admitted on an Inpatient basis with an anticipated length of stay of  >2 midnights  Justification for Hospital Stay:  Sepsis treatment    Total Time for Visit, including Counseling / Coordination of Care: 45 minutes  Greater than 50% of this total time spent on direct patient counseling and coordination of care  Chief Complaint:   Unable to assess due to underlying memory impairment    History of Present Illness:    Georgie Best is a 80 y o  female  [facility] resident who presented from facility after fall with head strike  Patient was evaluated by trauma and due to negative trauma workup admission under hospitalist service was recommended as patient found to have a temperature maximum in the emergency room of 38 3C and significantly elevated blood pressure  Upon my assessment patient was unable to verbalize circumstances of fall, she denies pain  Her initial lactic acid of 2 4 went down to 1 8 with IV hydration urinalysis was positive for pyuria, nitrates and bacteriuria  Patient remains hemodynamically stable so she admitted to the hospitalist service on an inpatient basis  ER RN confirmed with daughter that patient is DNR DNI      Review of Systems:    Review of Systems   Constitutional: Positive for activity change  Past Medical and Surgical History:     History reviewed  No pertinent past medical history      History reviewed  No pertinent surgical history  Meds/Allergies:    Prior to Admission medications    Medication Sig Start Date End Date Taking? Authorizing Provider   aspirin 81 mg chewable tablet Chew 81 mg daily   Yes Historical Provider, MD   atenolol (TENORMIN) 25 mg tablet Take 25 mg by mouth daily   Yes Historical Provider, MD   B Complex-C (B-COMPLEX WITH VITAMIN C) tablet Take 1 tablet by mouth daily   Yes Historical Provider, MD   Calcium 600-400 MG-UNIT CHEW Chew   Yes Historical Provider, MD   docusate sodium (COLACE) 100 mg capsule Take 100 mg by mouth 2 (two) times a day   Yes Historical Provider, MD   Ergocalciferol (VITAMIN D2 PO) Take 1 25 mg by mouth   Yes Historical Provider, MD   loperamide (IMODIUM) 2 mg capsule Take 2 mg by mouth 4 (four) times a day as needed for diarrhea   Yes Historical Provider, MD   lovastatin (MEVACOR) 20 mg tablet Take 20 mg by mouth daily at bedtime   Yes Historical Provider, MD   Potassium (POTASSIMIN PO) Take 10 mEq by mouth   Yes Historical Provider, MD   timolol (BETIMOL) 0 5 % ophthalmic solution 1 drop 2 (two) times a day   Yes Historical Provider, MD     I have reviewed home medications with a medical source (PCP, Pharmacy, other)  Allergies: Allergies   Allergen Reactions    Iodine     Penicillins        Social History:     Marital Status:     Occupation: none  Patient Pre-hospital Living Situation:NH  Patient Pre-hospital Level of Mobility:  Unknown  Patient Pre-hospital Diet Restrictions:  Unknown  Substance Use History:   Social History     Substance and Sexual Activity   Alcohol Use Not Currently     Social History     Tobacco Use   Smoking Status Unknown If Ever Smoked   Smokeless Tobacco Never Used     Social History     Substance and Sexual Activity   Drug Use Never       Family History:    non-contributory    Physical Exam:     Vitals:   Blood Pressure: (!) 210/83 (12/30/19 0230)  Pulse: 78 (12/30/19 0230)  Temperature: (!) 101 °F (38 3 °C) (12/29/19 1858)  Temp Source: Tympanic (12/29/19 1858)  Respirations: 20 (12/30/19 0230)  Weight - Scale: 47 7 kg (105 lb 2 6 oz) (12/29/19 1858)  SpO2: 96 % (12/30/19 0230)    Physical Exam   Constitutional: No distress  HENT:   Head: Normocephalic  Eyes: Pupils are equal, round, and reactive to light  Neck: Normal range of motion  Cardiovascular: Regular rhythm  Pulmonary/Chest:   Decreased breath sounds bilaterally   Abdominal: Soft  Musculoskeletal: She exhibits no edema  Neurological: She is alert  Confused   Skin: Skin is warm  Psychiatric: She has a normal mood and affect  Additional Data:     Lab Results: I have personally reviewed pertinent reports  Results from last 7 days   Lab Units 12/29/19 1919   WBC Thousand/uL 7 90   HEMOGLOBIN g/dL 10 3*   HEMATOCRIT % 32 7*   PLATELETS Thousands/uL 150   NEUTROS PCT % 78*   LYMPHS PCT % 11*   MONOS PCT % 5   EOS PCT % 5     Results from last 7 days   Lab Units 12/29/19 1919   SODIUM mmol/L 137   POTASSIUM mmol/L 3 9   CHLORIDE mmol/L 105   CO2 mmol/L 26   BUN mg/dL 28*   CREATININE mg/dL 1 13   ANION GAP mmol/L 6   CALCIUM mg/dL 9 7   ALBUMIN g/dL 3 0*   TOTAL BILIRUBIN mg/dL 0 48   ALK PHOS U/L 72   ALT U/L 20   AST U/L 30   GLUCOSE RANDOM mg/dL 138     Results from last 7 days   Lab Units 12/29/19 1919   INR  1 04             Results from last 7 days   Lab Units 12/29/19  2130 12/29/19 1919   LACTIC ACID mmol/L 1 8 2 4*   PROCALCITONIN ng/ml  --  <0 05       Imaging: I have personally reviewed pertinent reports  CT head without contrast   Final Result by David Sandhu MD (1933)      No acute intracranial abnormality  I personally discussed this study with Dr Jose Eduardo Lebron on 12/29/2019 at 7:33 PM                         Workstation performed: [id number]         CT spine cervical without contrast   Final Result by David Sandhu MD (12/29 1930)      No cervical spine fracture or traumatic malalignment  Workstation performed: [id number]         XR trauma multiple    (Results Pending)   XR chest 1 view    (Results Pending)   XR knee 1 or 2 vw left    (Results Pending)       EKG, Pathology, and Other Studies Reviewed on Admission:   · EKG: sinus    Allscripts / Epic Records Reviewed: Yes     ** Please Note: This note has been constructed using a voice recognition system   **

## 2019-12-30 NOTE — PROGRESS NOTES
Progress Note - Diego German 9/20/1925, 80 y o  female MRN: [id number]    Unit/Bed#: [facility] 715-01 Encounter: [id number]    Primary Care Provider: No primary care provider on file  Date and time admitted to hospital: 12/29/2019  6:52 PM      * Severe sepsis [facility])  Assessment & Plan  · Patient presented from nursing home s/p mechanical fall, trauma cleared, imaging negative  · Incidentally found to have a positive sepsis criteria of temperature maximum 38 3 C respiratory 28 lactic acid 2 4 on admission  · Urinalysis was grossly positive for pyuria bacteriuria nitrate most likely the source of infection is UTI  Continue IV ceftriaxone for now   · Follow-up urine culture, follow-up blood cultures  · influenza and RSV PCR negative  · Continue IV fluids  · PT/OT input appreciated  · Per discussion between POA and ER--patient is DNR Eugenio Lwo is daughter Zuhair Melgoza [phone]  No invasive intervention or blood transfusions      Urinary tract infection  Assessment & Plan  · Continue IV antibiotics for now, continue IV fluids  · Follow up urine culture and tailor as indicated    [address]  · Originally presented status post fall, seems mechanical based on report, patient unable to provide additional history regarding this  · Trauma workup negative  · Patient found to have a severe sepsis secondary to urinary tract infection  · PT/OT, may require higher level of care discharge  · Telemetry without any arrhythmias, will discontinue    Lactose intolerance  Assessment & Plan  · Lactose restricted diet per my discussion with her daughter, add Lactaid pills t i d  Protein-calorie malnutrition, moderate (HCC)  Assessment & Plan  Malnutrition Findings:        muscle wasting    BMI Findings: There is no height or weight on file to calculate BMI     · Patient is 47 7 kilos, low albumin  · Nutritionist consult      Hypertensive urgency  Assessment & Plan  · Blood pressure on admission 194/100, now improved  · Continue home medications    Elevated lactic acid level  Assessment & Plan  · Most likely secondary to dehydration and #1  · Lactic acid trended down to 1 8 with IV fluids  · Will continue IV fluids for another day, consider discontinue tomorrow morning      VTE Pharmacologic Prophylaxis:   Pharmacologic: Enoxaparin (Lovenox)  Mechanical VTE Prophylaxis in Place: Yes    Patient Centered Rounds: I have performed bedside rounds with nursing staff today  Discussions with Specialists or Other Care Team Provider:  Discussed with case management, appreciate Trauma input    Education and Discussions with Family / Patient:  Patient  Updated daughter Ruthie Bosworth at bedside    Time Spent for Care: 30 minutes  More than 50% of total time spent on counseling and coordination of care as described above  Current Length of Stay: 1 day(s)    Current Patient Status: Inpatient   Certification Statement: The patient will continue to require additional inpatient hospital stay due to Ongoing workup as above    Discharge Plan:  Not yet medically stable    Code Status: Level 3 - DNAR and DNI      Subjective:   Patient denies any acute complaints today  Daughter at bedside reports she was having some diarrhea earlier secondary to having lactose and her being lactose intolerant  Objective:     Vitals:   Temp (24hrs), Av 8 °F (37 7 °C), Min:98 5 °F (36 9 °C), Max:101 °F (38 3 °C)    Temp:  [98 5 °F (36 9 °C)-101 °F (38 3 °C)] 98 5 °F (36 9 °C)  HR:  [65-80] 65  Resp:  [18-34] 19  BP: (136-210)/() 136/49  SpO2:  [94 %-99 %] 97 %  There is no height or weight on file to calculate BMI  Input and Output Summary (last 24 hours): Intake/Output Summary (Last 24 hours) at 2019 1129  Last data filed at 2019 1125  Gross per 24 hour   Intake 1000 ml   Output 183 ml   Net 817 ml       Physical Exam:     Physical Exam   Constitutional: She is oriented to person, place, and time  No distress  Cardiovascular: Normal rate and regular rhythm  Pulmonary/Chest: Effort normal and breath sounds normal    Abdominal: Soft  Bowel sounds are normal  She exhibits no distension  Musculoskeletal: She exhibits no edema  Neurological: She is alert and oriented to person, place, and time  Skin: Skin is warm and dry  Psychiatric: She has a normal mood and affect  Nursing note and vitals reviewed  Additional Data:     Labs:    Results from last 7 days   Lab Units 12/30/19 0626 12/29/19 1919   WBC Thousand/uL 6 80 7 90   HEMOGLOBIN g/dL 9 9* 10 3*   HEMATOCRIT % 30 6* 32 7*   PLATELETS Thousands/uL 136* 150   NEUTROS PCT %  --  78*   LYMPHS PCT %  --  11*   MONOS PCT %  --  5   EOS PCT %  --  5     Results from last 7 days   Lab Units 12/30/19 0625 12/29/19 1919 12/29/19 1908   POTASSIUM mmol/L 3 6 3 9   < >  --    CHLORIDE mmol/L 112* 105   < >  --    CO2 mmol/L 22 26   < >  --    CO2, I-STAT mmol/L  --   --   --  26   BUN mg/dL 22 28*   < >  --    CREATININE mg/dL 0 78 1 13   < >  --    CALCIUM mg/dL 8 9 9 7   < >  --    ALK PHOS U/L  --  72  --   --    ALT U/L  --  20  --   --    AST U/L  --  30  --   --    GLUCOSE, ISTAT mg/dl  --   --   --  140    < > = values in this interval not displayed  Results from last 7 days   Lab Units 12/29/19 1919   INR  1 04       * I Have Reviewed All Lab Data Listed Above  * Additional Pertinent Lab Tests Reviewed: All Labs Within Last 24 Hours Reviewed    Imaging:    Imaging Reports Reviewed Today Include: All  Imaging Personally Reviewed by Myself Includes:  None    Recent Cultures (last 7 days):     Results from last 7 days   Lab Units 12/29/19 1924 12/29/19 1919   BLOOD CULTURE  Received in Microbiology Lab  Culture in Progress  Received in Microbiology Lab  Culture in Progress         Last 24 Hours Medication List:     Current Facility-Administered Medications:  acetaminophen 650 mg Oral Q6H PRN Oliver Richardson MD    aspirin 81 mg Oral Daily Leonid Ramsay Jo-Ann oMntez MD    atenolol 25 mg Oral Daily Gina Darling MD    cefTRIAXone 1,000 mg Intravenous Q24H Gina Darling MD Last Rate: 1,000 mg (12/30/19 2409)   cholecalciferol 1,000 Units Oral Daily Gina Darling MD    enoxaparin 30 mg Subcutaneous Daily Gina Darling MD    hydrALAZINE 5 mg Intravenous Q6H PRN Gina Darling MD    lactase 9,000 Units Oral TID With Meals Tessa English PA-C    loperamide 2 mg Oral 4x Daily PRN Gina Darling MD    multivitamin stress formula 1 tablet Oral Daily Gina Darling MD    ondansetron 4 mg Intravenous Q6H PRN Gina Darling MD    polyethylene glycol 17 g Oral Daily PRN Gina Darling MD    potassium chloride 10 mEq Oral Daily Gina Darling MD    pravastatin 20 mg Oral Daily With Ligia Dia MD    saccharomyces boulardii 250 mg Oral BID Tessa English PA-C    sodium chloride (PF) 3 mL Intravenous PRN Geneva Garcia MD    sodium chloride 75 mL/hr Intravenous Continuous Lolitacarmen English PA-C    timolol 1 drop Both Eyes BID Gina Darling MD         Today, Patient Was Seen By: Vale Reich PA-C    ** Please Note: Dictation voice to text software may have been used in the creation of this document   **

## 2019-12-30 NOTE — ED NOTES
Patient incontinent of urine, patient cleaned and dry linens placed on bed    Pure wick placed     Krysta Chauhan RN  12/30/19 4933

## 2019-12-30 NOTE — ASSESSMENT & PLAN NOTE
· Continue IV antibiotics for now, continue IV fluids  · Follow up urine culture and tailor as indicated

## 2019-12-31 LAB
ANION GAP SERPL CALCULATED.3IONS-SCNC: 7 MMOL/L (ref 4–13)
BASOPHILS # BLD AUTO: 0.02 THOUSANDS/ΜL (ref 0–0.1)
BASOPHILS NFR BLD AUTO: 0 % (ref 0–1)
BUN SERPL-MCNC: 14 MG/DL (ref 5–25)
CALCIUM SERPL-MCNC: 8.7 MG/DL (ref 8.3–10.1)
CHLORIDE SERPL-SCNC: 115 MMOL/L (ref 100–108)
CO2 SERPL-SCNC: 20 MMOL/L (ref 21–32)
CREAT SERPL-MCNC: 0.67 MG/DL (ref 0.6–1.3)
EOSINOPHIL # BLD AUTO: 0.24 THOUSAND/ΜL (ref 0–0.61)
EOSINOPHIL NFR BLD AUTO: 5 % (ref 0–6)
ERYTHROCYTE [DISTWIDTH] IN BLOOD BY AUTOMATED COUNT: 16.7 % (ref 11.6–15.1)
GFR SERPL CREATININE-BSD FRML MDRD: 75 ML/MIN/1.73SQ M
GLUCOSE SERPL-MCNC: 113 MG/DL (ref 65–140)
GLUCOSE SERPL-MCNC: 73 MG/DL (ref 65–140)
HCT VFR BLD AUTO: 28.9 % (ref 34.8–46.1)
HGB BLD-MCNC: 9.1 G/DL (ref 11.5–15.4)
IMM GRANULOCYTES # BLD AUTO: 0.02 THOUSAND/UL (ref 0–0.2)
IMM GRANULOCYTES NFR BLD AUTO: 0 % (ref 0–2)
LYMPHOCYTES # BLD AUTO: 0.76 THOUSANDS/ΜL (ref 0.6–4.47)
LYMPHOCYTES NFR BLD AUTO: 17 % (ref 14–44)
MCH RBC QN AUTO: 28.5 PG (ref 26.8–34.3)
MCHC RBC AUTO-ENTMCNC: 31.5 G/DL (ref 31.4–37.4)
MCV RBC AUTO: 91 FL (ref 82–98)
MONOCYTES # BLD AUTO: 0.25 THOUSAND/ΜL (ref 0.17–1.22)
MONOCYTES NFR BLD AUTO: 6 % (ref 4–12)
NEUTROPHILS # BLD AUTO: 3.21 THOUSANDS/ΜL (ref 1.85–7.62)
NEUTS SEG NFR BLD AUTO: 72 % (ref 43–75)
NRBC BLD AUTO-RTO: 0 /100 WBCS
PLATELET # BLD AUTO: 111 THOUSANDS/UL (ref 149–390)
PMV BLD AUTO: 11.1 FL (ref 8.9–12.7)
POTASSIUM SERPL-SCNC: 3.4 MMOL/L (ref 3.5–5.3)
RBC # BLD AUTO: 3.19 MILLION/UL (ref 3.81–5.12)
SODIUM SERPL-SCNC: 142 MMOL/L (ref 136–145)
WBC # BLD AUTO: 4.5 THOUSAND/UL (ref 4.31–10.16)

## 2019-12-31 PROCEDURE — 85025 COMPLETE CBC W/AUTO DIFF WBC: CPT | Performed by: INTERNAL MEDICINE

## 2019-12-31 PROCEDURE — 82948 REAGENT STRIP/BLOOD GLUCOSE: CPT

## 2019-12-31 PROCEDURE — 80048 BASIC METABOLIC PNL TOTAL CA: CPT | Performed by: INTERNAL MEDICINE

## 2019-12-31 PROCEDURE — 99232 SBSQ HOSP IP/OBS MODERATE 35: CPT | Performed by: INTERNAL MEDICINE

## 2019-12-31 RX ORDER — POTASSIUM CHLORIDE 20 MEQ/1
20 TABLET, EXTENDED RELEASE ORAL ONCE
Status: COMPLETED | OUTPATIENT
Start: 2019-12-31 | End: 2019-12-31

## 2019-12-31 RX ORDER — HYDRALAZINE HYDROCHLORIDE 20 MG/ML
5 INJECTION INTRAMUSCULAR; INTRAVENOUS EVERY 6 HOURS PRN
Status: ACTIVE | OUTPATIENT
Start: 2019-12-31 | End: 2020-01-01

## 2019-12-31 RX ORDER — ATENOLOL 50 MG/1
50 TABLET ORAL DAILY
Status: DISCONTINUED | OUTPATIENT
Start: 2020-01-01 | End: 2020-01-07 | Stop reason: HOSPADM

## 2019-12-31 RX ADMIN — PRAVASTATIN SODIUM 20 MG: 20 TABLET ORAL at 17:23

## 2019-12-31 RX ADMIN — TIMOLOL MALEATE 1 DROP: 5 SOLUTION/ DROPS OPHTHALMIC at 08:22

## 2019-12-31 RX ADMIN — ASPIRIN 81 MG 81 MG: 81 TABLET ORAL at 08:21

## 2019-12-31 RX ADMIN — TIMOLOL MALEATE 1 DROP: 5 SOLUTION/ DROPS OPHTHALMIC at 17:23

## 2019-12-31 RX ADMIN — LACTASE TAB 3000 UNIT 9000 UNITS: 3000 TAB at 17:23

## 2019-12-31 RX ADMIN — ONDANSETRON 4 MG: 2 INJECTION INTRAMUSCULAR; INTRAVENOUS at 16:18

## 2019-12-31 RX ADMIN — LACTASE TAB 3000 UNIT 9000 UNITS: 3000 TAB at 08:21

## 2019-12-31 RX ADMIN — MELATONIN 1000 UNITS: at 08:21

## 2019-12-31 RX ADMIN — Medication 250 MG: at 08:21

## 2019-12-31 RX ADMIN — ACETAMINOPHEN 650 MG: 325 TABLET ORAL at 12:12

## 2019-12-31 RX ADMIN — POTASSIUM CHLORIDE 20 MEQ: 1500 TABLET, EXTENDED RELEASE ORAL at 12:13

## 2019-12-31 RX ADMIN — ENOXAPARIN SODIUM 30 MG: 30 INJECTION SUBCUTANEOUS at 08:21

## 2019-12-31 RX ADMIN — ZINC 1 TABLET: TAB ORAL at 08:22

## 2019-12-31 RX ADMIN — POTASSIUM CHLORIDE 10 MEQ: 750 TABLET, EXTENDED RELEASE ORAL at 08:21

## 2019-12-31 RX ADMIN — LACTASE TAB 3000 UNIT 9000 UNITS: 3000 TAB at 12:13

## 2019-12-31 RX ADMIN — ATENOLOL 25 MG: 25 TABLET ORAL at 08:21

## 2019-12-31 RX ADMIN — CEFTRIAXONE 1000 MG: 1 INJECTION, POWDER, FOR SOLUTION INTRAMUSCULAR; INTRAVENOUS at 06:10

## 2019-12-31 RX ADMIN — Medication 250 MG: at 17:23

## 2019-12-31 NOTE — ASSESSMENT & PLAN NOTE
Malnutrition Findings:        muscle wasting    BMI Findings: Body mass index is 28 38 kg/m²     · Patient is 47 7 kilos, low albumin  · Nutritionist consult

## 2019-12-31 NOTE — ASSESSMENT & PLAN NOTE
· Most likely secondary to dehydration and #1     · Lactic acid trended down to 1 8 with IV fluids  · DC IVF

## 2019-12-31 NOTE — ASSESSMENT & PLAN NOTE
· Blood pressure on admission 194/100, improved but still above goal  · Will increase atenolol to 50 mg starting tomorrow   · Add PRN hydralazine for SBP >170  · Continue home medications

## 2019-12-31 NOTE — RESTORATIVE TECHNICIAN NOTE
Restorative Specialist Mobility Note       Activity: Ambulate in room, Chair, Dangle, Stand at bedside(Educated/encouraged pt to ambulate with assistance 3-4 x's/day  Chair alarm on   Pt callbell, phone/tray within reach )     Assistive Device: Other (Comment), Front wheel walker(HHA x2 OOB to the chair )       Augustus ZAMBRANO, Restorative Technician, [employer]

## 2019-12-31 NOTE — CONSULTS
Consult Note - Wound   Kevin Hill 80 y o  female MRN: [id number]  Unit/Bed#: [facility] 715-01 Encounter: [id number]      History and Present Illness:  Patient is a 80year old female admitted after a fall  Patient examined while sitting out of bed in her chair with her daughter present  Patient able to stand with maximum assist and use of roller walker for full exam   Patient is incontinent of bowel and bladder  Assessment Findings:   1  Heels intact  2  sacro-buttocks intact with dry chafed skin      See flowsheets for details          Skin care plans:  1-Hydraguard to bilateral sacrum, buttock and heels BID and PRN  2-Elevate heels to offload pressure  3-Ehob cushion in chair when out of bed  4-Moisturize skin daily with skin nourishing cream   5-Turn/reposition q2h or when medically stable for pressure re-distribution on skin  Wound Care will sign off  Call or Tiger text with any questions

## 2019-12-31 NOTE — PROGRESS NOTES
Progress Note - Valentina Ramos 9/20/1925, 80 y o  female MRN: [id number]    Unit/Bed#: [facility] 715-01 Encounter: [id number]    Primary Care Provider: No primary care provider on file  Date and time admitted to hospital: 12/29/2019  6:52 PM        * Severe sepsis [facility])  Assessment & Plan  · Patient presented from nursing home s/p mechanical fall, trauma cleared, imaging negative  · Incidentally found to have a positive sepsis criteria of temperature maximum 101 respiratory 28 lactic acid 2 4 on admission  · Urinalysis was grossly positive for pyuria bacteriuria nitrate most likely the source of infection is UTI  Continue IV ceftriaxone for now   · Blood cultures negative x 24 hours, Urine culture with gram neg rods at this time, f/u final  · influenza and RSV PCR negative  · Afebrile and no leukocytosis, clinically well  · PT/OT input appreciated--recommend return to [facility] with additional services  · Per discussion between POA and ER--patient is DNR Gema Mercado is daughter Nabeel Dobbs [phone]  No invasive intervention or blood transfusions      Urinary tract infection  Assessment & Plan  · Continue IV antibiotics for now  · Follow up urine culture and tailor as indicated    Fall  Assessment & Plan  · Originally presented status post fall, seems mechanical based on report, patient unable to provide additional history regarding this  · Trauma workup negative  · Patient found to have a severe sepsis secondary to urinary tract infection  · PT/OT ok with return to facility with script for PT/OT, reviewed with CM  · Telemetry without any arrhythmias, was discontinued 12/20    Lactose intolerance  Assessment & Plan  · Lactose restricted diet per my discussion with her daughter, added Lactaid pills t i d  Protein-calorie malnutrition, moderate (HCC)  Assessment & Plan  Malnutrition Findings:        muscle wasting    BMI Findings: Body mass index is 28 38 kg/m²     · Patient is 47 7 kilos, low albumin  · Nutritionist consult      Hypertensive urgency  Assessment & Plan  · Blood pressure on admission 194/100, improved but still above goal  · Will increase atenolol to 50 mg starting tomorrow   · Add PRN hydralazine for SBP >170  · Continue home medications    Elevated lactic acid level  Assessment & Plan  · Most likely secondary to dehydration and #1  · Lactic acid trended down to 1 8 with IV fluids  · DC IVF      VTE Pharmacologic Prophylaxis:   Pharmacologic: Enoxaparin (Lovenox)  Mechanical VTE Prophylaxis in Place: Yes    Patient Centered Rounds: I have performed bedside rounds with nursing staff today  Discussions with Specialists or Other Care Team Provider: case management  Education and Discussions with Family / Patient: patient and daughter Nikky Galdamez at bedside  All questions answered  Time Spent for Care: 30 minutes  More than 50% of total time spent on counseling and coordination of care as described above  Current Length of Stay: 2 day(s)    Current Patient Status: Inpatient   Certification Statement: The patient will continue to require additional inpatient hospital stay due to final urine cx     Discharge Plan: hopeful dc tomorrow pending final urine cx    Code Status: Level 3 - DNAR and DNI      Subjective:   Doing well  Dtr at bedside states that patient has normal temperature is 97° and she is concerned about a temperature of 98°  Objective:     Vitals:   Temp (24hrs), Av 8 °F (37 1 °C), Min:98 8 °F (37 1 °C), Max:98 8 °F (37 1 °C)    Temp:  [98 8 °F (37 1 °C)] 98 8 °F (37 1 °C)  HR:  [62-68] 62  Resp:  [16-18] 18  BP: (136-165)/(47-65) 140/60  SpO2:  [94 %-99 %] 96 %  Body mass index is 28 38 kg/m²  Input and Output Summary (last 24 hours): Intake/Output Summary (Last 24 hours) at 2019 1232  Last data filed at 2019 5004  Gross per 24 hour   Intake 726 25 ml   Output    Net 726 25 ml       Physical Exam:     Physical Exam   Constitutional: No distress     Cardiovascular: Normal rate and regular rhythm  Pulmonary/Chest: Effort normal and breath sounds normal  No respiratory distress  Abdominal: Soft  Bowel sounds are normal  She exhibits no distension  Musculoskeletal: She exhibits no edema  Psychiatric: She has a normal mood and affect  Nursing note and vitals reviewed  Additional Data:     Labs:    Results from last 7 days   Lab Units 12/31/19 0458   WBC Thousand/uL 4 50   HEMOGLOBIN g/dL 9 1*   HEMATOCRIT % 28 9*   PLATELETS Thousands/uL 111*   NEUTROS PCT % 72   LYMPHS PCT % 17   MONOS PCT % 6   EOS PCT % 5     Results from last 7 days   Lab Units 12/31/19 0458 12/29/19 1919 12/29/19 1908   POTASSIUM mmol/L 3 4*   < > 3 9  --    CHLORIDE mmol/L 115*   < > 105  --    CO2 mmol/L 20*   < > 26  --    CO2, I-STAT mmol/L  --   --   --  26   BUN mg/dL 14   < > 28*  --    CREATININE mg/dL 0 67   < > 1 13  --    CALCIUM mg/dL 8 7   < > 9 7  --    ALK PHOS U/L  --   --  72  --    ALT U/L  --   --  20  --    AST U/L  --   --  30  --    GLUCOSE, ISTAT mg/dl  --   --   --  140    < > = values in this interval not displayed  Results from last 7 days   Lab Units 12/29/19 1919   INR  1 04       * I Have Reviewed All Lab Data Listed Above  * Additional Pertinent Lab Tests Reviewed: All Labs Within Last 24 Hours Reviewed    Imaging:    Imaging Reports Reviewed Today Include: all  Imaging Personally Reviewed by Myself Includes:  none    Recent Cultures (last 7 days):     Results from last 7 days   Lab Units 12/29/19 2125 12/29/19 1924 12/29/19 1919   BLOOD CULTURE   --  No Growth at 24 hrs  No Growth at 24 hrs     URINE CULTURE  >100,000 cfu/ml Gram Negative Ryan*  --   --        Last 24 Hours Medication List:     Current Facility-Administered Medications:  acetaminophen 650 mg Oral Q6H PRN Nayeli Aponte MD    aspirin 81 mg Oral Daily Nayeli Aponte MD    [START ON 1/1/2020] atenolol 50 mg Oral Daily Lolita English PA-C    cefTRIAXone 1,000 mg Intravenous Q24H Yariel Enriquez MD Last Rate: 1,000 mg (12/31/19 0610)   cholecalciferol 1,000 Units Oral Daily Yariel Enriquez MD    enoxaparin 30 mg Subcutaneous Daily Yariel Enriquez MD    hydrALAZINE 5 mg Intravenous Q6H PRN Rush Jessica PA-C    lactase 9,000 Units Oral TID With Meals Robert English PA-C    loperamide 2 mg Oral 4x Daily PRN Yariel Enriquez MD    multivitamin stress formula 1 tablet Oral Daily Yariel Enriquez MD    ondansetron 4 mg Intravenous Q6H PRN Yariel Enriquez MD    polyethylene glycol 17 g Oral Daily PRN Yariel Enriquez MD    potassium chloride 10 mEq Oral Daily Yariel Enriquez MD    pravastatin 20 mg Oral Daily With Thomas Pablo MD    saccharomyces boulardii 250 mg Oral BID Robert English PA-C    sodium chloride (PF) 3 mL Intravenous PRN Matt Gr MD    timolol 1 drop Both Eyes BID Yariel Enriquez MD         Today, Patient Was Seen By: Rush Jessica PA-C    ** Please Note: Dictation voice to text software may have been used in the creation of this document   **

## 2019-12-31 NOTE — DISCHARGE INSTR - OTHER ORDERS
Skin care plans:  1-Hydraguard to bilateral sacrum, buttock and heels BID and PRN  2-Elevate heels to offload pressure  3-Ehob cushion in chair when out of bed  4-Moisturize skin daily with skin nourishing cream   5-Turn/reposition q2h or when medically stable for pressure re-distribution on skin

## 2019-12-31 NOTE — ASSESSMENT & PLAN NOTE
· Originally presented status post fall, seems mechanical based on report, patient unable to provide additional history regarding this  · Trauma workup negative  · Patient found to have a severe sepsis secondary to urinary tract infection  · PT/OT ok with return to facility with script for PT/OT, reviewed with CM  · Telemetry without any arrhythmias, was discontinued 12/20

## 2019-12-31 NOTE — ASSESSMENT & PLAN NOTE
· Patient presented from nursing home s/p mechanical fall, trauma cleared, imaging negative  · Incidentally found to have a positive sepsis criteria of temperature maximum 101 respiratory 28 lactic acid 2 4 on admission  · Urinalysis was grossly positive for pyuria bacteriuria nitrate most likely the source of infection is UTI  Continue IV ceftriaxone for now   · Blood cultures negative x 24 hours, Urine culture with gram neg rods at this time, f/u final  · influenza and RSV PCR negative  · Afebrile and no leukocytosis, clinically well  · PT/OT input appreciated--recommend return to [facility] with additional services  · Per discussion between POA and ER--patient is DNR Sheela Black is daughter Arash [facility] [phone]    No invasive intervention or blood transfusions

## 2020-01-01 LAB
BACTERIA UR CULT: ABNORMAL
BASOPHILS # BLD AUTO: 0.02 THOUSANDS/ΜL (ref 0–0.1)
BASOPHILS NFR BLD AUTO: 0 % (ref 0–1)
EOSINOPHIL # BLD AUTO: 0.25 THOUSAND/ΜL (ref 0–0.61)
EOSINOPHIL NFR BLD AUTO: 5 % (ref 0–6)
ERYTHROCYTE [DISTWIDTH] IN BLOOD BY AUTOMATED COUNT: 16.6 % (ref 11.6–15.1)
HCT VFR BLD AUTO: 31.4 % (ref 34.8–46.1)
HGB BLD-MCNC: 9.6 G/DL (ref 11.5–15.4)
IMM GRANULOCYTES # BLD AUTO: 0.01 THOUSAND/UL (ref 0–0.2)
IMM GRANULOCYTES NFR BLD AUTO: 0 % (ref 0–2)
LYMPHOCYTES # BLD AUTO: 0.66 THOUSANDS/ΜL (ref 0.6–4.47)
LYMPHOCYTES NFR BLD AUTO: 14 % (ref 14–44)
MCH RBC QN AUTO: 27.8 PG (ref 26.8–34.3)
MCHC RBC AUTO-ENTMCNC: 30.6 G/DL (ref 31.4–37.4)
MCV RBC AUTO: 91 FL (ref 82–98)
MONOCYTES # BLD AUTO: 0.37 THOUSAND/ΜL (ref 0.17–1.22)
MONOCYTES NFR BLD AUTO: 8 % (ref 4–12)
NEUTROPHILS # BLD AUTO: 3.41 THOUSANDS/ΜL (ref 1.85–7.62)
NEUTS SEG NFR BLD AUTO: 73 % (ref 43–75)
NRBC BLD AUTO-RTO: 0 /100 WBCS
PLATELET # BLD AUTO: 126 THOUSANDS/UL (ref 149–390)
PMV BLD AUTO: 10.8 FL (ref 8.9–12.7)
RBC # BLD AUTO: 3.45 MILLION/UL (ref 3.81–5.12)
WBC # BLD AUTO: 4.72 THOUSAND/UL (ref 4.31–10.16)

## 2020-01-01 PROCEDURE — 99223 1ST HOSP IP/OBS HIGH 75: CPT | Performed by: INTERNAL MEDICINE

## 2020-01-01 PROCEDURE — 85025 COMPLETE CBC W/AUTO DIFF WBC: CPT | Performed by: INTERNAL MEDICINE

## 2020-01-01 PROCEDURE — 99232 SBSQ HOSP IP/OBS MODERATE 35: CPT | Performed by: INTERNAL MEDICINE

## 2020-01-01 RX ORDER — HYDRALAZINE HYDROCHLORIDE 20 MG/ML
5 INJECTION INTRAMUSCULAR; INTRAVENOUS EVERY 6 HOURS PRN
Status: DISCONTINUED | OUTPATIENT
Start: 2020-01-01 | End: 2020-01-07 | Stop reason: HOSPADM

## 2020-01-01 RX ORDER — LOPERAMIDE HYDROCHLORIDE 2 MG/1
2 CAPSULE ORAL DAILY
Status: DISCONTINUED | OUTPATIENT
Start: 2020-01-02 | End: 2020-01-07 | Stop reason: HOSPADM

## 2020-01-01 RX ORDER — CEFAZOLIN SODIUM 1 G/50ML
1000 SOLUTION INTRAVENOUS EVERY 12 HOURS
Status: DISCONTINUED | OUTPATIENT
Start: 2020-01-01 | End: 2020-01-05

## 2020-01-01 RX ADMIN — LACTASE TAB 3000 UNIT 9000 UNITS: 3000 TAB at 11:13

## 2020-01-01 RX ADMIN — PRAVASTATIN SODIUM 20 MG: 20 TABLET ORAL at 16:54

## 2020-01-01 RX ADMIN — LOPERAMIDE HYDROCHLORIDE 2 MG: 2 CAPSULE ORAL at 09:06

## 2020-01-01 RX ADMIN — CEFAZOLIN SODIUM 1000 MG: 1 SOLUTION INTRAVENOUS at 18:49

## 2020-01-01 RX ADMIN — ATENOLOL 50 MG: 50 TABLET ORAL at 08:16

## 2020-01-01 RX ADMIN — Medication 250 MG: at 16:54

## 2020-01-01 RX ADMIN — TIMOLOL MALEATE 1 DROP: 5 SOLUTION/ DROPS OPHTHALMIC at 16:51

## 2020-01-01 RX ADMIN — TIMOLOL MALEATE 1 DROP: 5 SOLUTION/ DROPS OPHTHALMIC at 08:57

## 2020-01-01 RX ADMIN — Medication 250 MG: at 08:16

## 2020-01-01 RX ADMIN — LACTASE TAB 3000 UNIT 9000 UNITS: 3000 TAB at 16:51

## 2020-01-01 RX ADMIN — VANCOMYCIN HYDROCHLORIDE 750 MG: 750 INJECTION, SOLUTION INTRAVENOUS at 11:24

## 2020-01-01 RX ADMIN — MELATONIN 1000 UNITS: at 08:16

## 2020-01-01 RX ADMIN — LACTASE TAB 3000 UNIT 9000 UNITS: 3000 TAB at 06:31

## 2020-01-01 RX ADMIN — ZINC 1 TABLET: TAB ORAL at 08:57

## 2020-01-01 RX ADMIN — ENOXAPARIN SODIUM 30 MG: 30 INJECTION SUBCUTANEOUS at 08:16

## 2020-01-01 RX ADMIN — ASPIRIN 81 MG 81 MG: 81 TABLET ORAL at 08:16

## 2020-01-01 RX ADMIN — POTASSIUM CHLORIDE 10 MEQ: 750 TABLET, EXTENDED RELEASE ORAL at 08:16

## 2020-01-01 NOTE — CONSULTS
Consultation - Infectious Disease   Toma Costa 80 y o  female MRN: [id number]  Unit/Bed#: [facility] 065-00 Encounter: [id number]      Inpatient consult to Infectious Diseases  Consult performed by: Louise Rebolledo MD  Consult ordered by: Sunshine Tyler DO          IMPRESSION & RECOMMENDATIONS:   Impression:  1  Sepsis R/0 UTI versus Gram-positive cocci bacteremia    Recommendations:    Discuss with the primary service  1  Check results of the identification of the Gram-positive cocci blood isolate  2  We will change ceftriaxone to cefazolin 1 g q 12 hours IV  3  Pending blood culture identification will continue vancomycin IV with further dosing by pharmacy   4  Would consider a transthoracic echocardiogram      HISTORY OF PRESENT ILLNESS:    Reason for Consult:  Possible UTI with bacteremia and history of C diff  HPI:  The patient has memory difficulties and is a poor historian  The history was obtained from the chart and with difficulty from the patient  Toma Costa is a 80y o  year old female who was brought in after having a syncopal episode with a fall at the nursing home on 12/30  The patient was noted to be febrile to 101° and had criteria for sepsis  It was felt that she had a likely UTI and ceftriaxone IV was begun  Her urine culture has grown out greater than 100,000 E coli that was susceptible to all tested and 1 of 2 blood cultures is showing gram-positive cocci in clusters  The patient was started today on vancomycin 750 mg IV x1 dose  The ceftriaxone dose was held today  Review of Systems   Unable to perform ROS: Other    patient is a poor historian  She denies any pain but says that on occasion she has some discomfort when urinating without a history of UTI and she uses a walker to ambulate  A [id number] point system-based review of systems is otherwise negative  PAST MEDICAL HISTORY:  History reviewed  No pertinent past medical history  History reviewed   No pertinent surgical history  FAMILY HISTORY:  Non-contributory    SOCIAL HISTORY:  Social History   , resident of nursing home  Social History     Substance and Sexual Activity   Alcohol Use Not Currently     Social History     Substance and Sexual Activity   Drug Use Never     Social History     Tobacco Use   Smoking Status Unknown If Ever Smoked   Smokeless Tobacco Never Used       ALLERGIES:  Allergies   Allergen Reactions    Iodine     Penicillins        MEDICATIONS:  All current active medications have been reviewed  PHYSICAL EXAM:  HR:  [62-73] 62  Resp:  [16-20] 16  BP: (152-175)/(59-72) 152/59  SpO2:  [93 %-95 %] 94 %  No data recorded  Current: Temperature: (!) 97 3 °F (36 3 °C)    Intake/Output Summary (Last 24 hours) at 1/1/2020 1740  Last data filed at 1/1/2020 1100  Gross per 24 hour   Intake 260 ml   Output    Net 260 ml       General Appearance:  Appearing well, nontoxic, and in no distress, appears stated age   Head:  Normocephalic, without obvious abnormality, atraumatic   Eyes:  PERRL, conjunctiva pale and sclera anicteric, both eyes   Nose: Nares normal, mucosa normal, no drainage   Throat: Oropharynx moist without lesions; lips, mucosa, and tongue normal; missing many teeth   Neck: Supple, symmetrical, trachea midline, no adenopathy, no tenderness/mass/nodules   Back:   Symmetric, no curvature, ROM normal, no CVA tenderness   Lungs:   Has by basilar rales   Chest Wall:  No tenderness or deformity   Heart:  Regular rate and rhythm, S1, S2 normal, grade 1/6 systolic murmur and a grade 2/6 diastolic murmur, no rub or gallop   Abdomen:   Soft, non-tender, non-distended, positive bowel sounds, no masses, no organomegaly    No CVA tenderness   Extremities: Extremities normal, atraumatic, no cyanosis, clubbing or edema   Skin: Skin color, texture, turgor normal, no rashes or lesions  No draining wounds noted     Lymph nodes: Cervical, supraclavicular, and axillary nodes normal   Neurologic: Alert and oriented times 3, extremity strength 5/5 and symmetric           Invasive Devices:   Peripheral IV 12/29/19 Left Forearm (Active)   Site Assessment Clean;Dry; Intact 1/1/2020  9:00 AM   Dressing Type Transparent 1/1/2020  9:00 AM   Line Status Saline locked; Flushed 1/1/2020  9:00 AM   Dressing Status Clean;Dry; Intact 1/1/2020  9:00 AM   Dressing Change Due 01/02/20 1/1/2020  9:00 AM       LABS, IMAGING, & OTHER STUDIES:  Lab Results:      I have personally reviewed pertinent labs  Results from last 7 days   Lab Units 01/01/20 0624 12/31/19 0458 12/30/19 0626   WBC Thousand/uL 4 72 4 50 6 80   HEMOGLOBIN g/dL 9 6* 9 1* 9 9*   PLATELETS Thousands/uL 126* 111* 136*     Results from last 7 days   Lab Units 12/31/19 0458 12/30/19 0625 12/29/19 1919 12/29/19 1908   SODIUM mmol/L 142 141 137  --   --    POTASSIUM mmol/L 3 4* 3 6 3 9   < >  --    CHLORIDE mmol/L 115* 112* 105   < >  --    CO2 mmol/L 20* 22 26   < >  --    CO2, I-STAT mmol/L  --   --   --   --  26   BUN mg/dL 14 22 28*   < >  --    CREATININE mg/dL 0 67 0 78 1 13   < >  --    EGFR ml/min/1 73sq m 75 65 42   < >  --    GLUCOSE, ISTAT mg/dl  --   --   --   --  140   CALCIUM mg/dL 8 7 8 9 9 7   < >  --    AST U/L  --   --  30  --   --    ALT U/L  --   --  20  --   --    ALK PHOS U/L  --   --  72  --   --     < > = values in this interval not displayed  Results from last 7 days   Lab Units 12/29/19 2125 12/29/19 1924 12/29/19 1919   BLOOD CULTURE   --  No Growth at 48 hrs   --    GRAM STAIN RESULT   --   --  Gram positive cocci in clusters*   URINE CULTURE  >100,000 cfu/ml Escherichia coli*  --   --        Imaging Studies:   I have personally reviewed pertinent imaging study reports and images in PACS  EKG, Pathology, and Other Studies:   I have personally reviewed pertinent reports

## 2020-01-01 NOTE — PROGRESS NOTES
The patient's daughter expressed concern about the type of antibiotics that the patient was receiving  LUIS Stiles with [facility] was made aware  No new were orders were noted at this time  Will report off the patient's daughter's concerns to the night shift nurse

## 2020-01-01 NOTE — ASSESSMENT & PLAN NOTE
· Secondary to E coli  · On IV ceftriaxone  · History of C diff colitis  · ID consulted for further management

## 2020-01-01 NOTE — ASSESSMENT & PLAN NOTE
· Originally presented status post fall, seems mechanical based on report, patient unable to provide additional history regarding this  · Trauma workup negative  · Patient found to have a severe sepsis secondary to urinary tract infection  · PT/OT ok with return to facility with script for PT/OT, reviewed with CM

## 2020-01-01 NOTE — PROGRESS NOTES
Lactaid tablet 9000 units were given to the patient when the lunch tray was in front of her  Pt offers no complaint at this time  Will continue to monitor

## 2020-01-01 NOTE — ASSESSMENT & PLAN NOTE
· Patient presented from nursing home s/p mechanical fall, trauma cleared, imaging negative  · Incidentally found to have a positive sepsis criteria of temperature maximum 101 respiratory 28 lactic acid 2 4 on admission  · Urinalysis was grossly positive for pyuria, bacteriuria, nitrate, most likely the source of infection is UTI      · Urine culture positive for E coli  · Blood culture 1/2 set  positive for gram-positive cocci in clusters  · influenza and RSV PCR negative  · Afebrile and no leukocytosis, clinically well  · PT/OT input appreciated--recommend return to [facility] with additional services  · Vancomycin IV x1 now  · Consult ID for further management

## 2020-01-01 NOTE — ASSESSMENT & PLAN NOTE
· Blood pressure on admission 194/100  · Improving  · Continue atenolol  · Add PRN hydralazine for SBP >170

## 2020-01-01 NOTE — PROGRESS NOTES
Progress Note - Violeta Collier 9/20/1925, 80 y o  female MRN: [id number]    Unit/Bed#: [facility] 715-01 Encounter: [id number]    Primary Care Provider: No primary care provider on file  Date and time admitted to hospital: 12/29/2019  6:52 PM        * Severe sepsis [facility])  Assessment & Plan  · Patient presented from nursing home s/p mechanical fall, trauma cleared, imaging negative  · Incidentally found to have a positive sepsis criteria of temperature maximum 101 respiratory 28 lactic acid 2 4 on admission  · Urinalysis was grossly positive for pyuria, bacteriuria, nitrate, most likely the source of infection is UTI  · Urine culture positive for E coli  · Blood culture 1/2 set  positive for gram-positive cocci in clusters  · influenza and RSV PCR negative  · Afebrile and no leukocytosis, clinically well  · PT/OT input appreciated--recommend return to [facility] with additional services  · Vancomycin IV x1 now  · Consult ID for further management        Urinary tract infection  Assessment & Plan  · Secondary to E coli  · On IV ceftriaxone  · History of C diff colitis  · ID consulted for further management     Fall  Assessment & Plan  · Originally presented status post fall, seems mechanical based on report, patient unable to provide additional history regarding this  · Trauma workup negative  · Patient found to have a severe sepsis secondary to urinary tract infection  · PT/OT ok with return to facility with script for PT/OT, reviewed with CM      Lactose intolerance  Assessment & Plan  · Lactose restricted diet   · added Lactaid pills t i d  Protein-calorie malnutrition, moderate (HCC)  Assessment & Plan  Malnutrition Findings:        muscle wasting    BMI Findings: Body mass index is 28 38 kg/m²     · Patient is 47 7 kilos, low albumin  · Nutritionist consult      Hypertensive urgency  Assessment & Plan  · Blood pressure on admission 194/100  · Improving  · Continue atenolol  · Add PRN hydralazine for SBP >170        VTE Pharmacologic Prophylaxis:   Pharmacologic: Enoxaparin (Lovenox)  Mechanical VTE Prophylaxis in Place: Yes    Patient Centered Rounds: I have performed bedside rounds with nursing staff today  Discussions with Specialists or Other Care Team Provider:     Education and Discussions with Family / Patient:  Discussed with patient's daughter, she has a concern about the antibiotic and duration specially with patient has a history of C diff colitis before    Time Spent for Care: 30 minutes  More than 50% of total time spent on counseling and coordination of care as described above  Current Length of Stay: 3 day(s)    Current Patient Status: Inpatient   Certification Statement: The patient will continue to require additional inpatient hospital stay due to Above    Discharge Plan / Estimated Discharge Date: TBD    Code Status: Level 3 - DNAR and DNI      Subjective:   Patient seen and examined  Comfortable in bed  No event overnight  She is requesting Lactaid pills to be on the table during meals    Objective:     Vitals:   Temp (24hrs), Av 3 °F (36 3 °C), Min:97 3 °F (36 3 °C), Max:97 3 °F (36 3 °C)    Temp:  [97 3 °F (36 3 °C)] 97 3 °F (36 3 °C)  HR:  [60-73] 73  Resp:  [16-20] 19  BP: (142-181)/(60-72) 175/72  SpO2:  [93 %-96 %] 95 %  Body mass index is 28 38 kg/m²  Input and Output Summary (last 24 hours):        Intake/Output Summary (Last 24 hours) at 2020 1433  Last data filed at 2020 1100  Gross per 24 hour   Intake 260 ml   Output    Net 260 ml       Physical Exam:     Physical Exam  Patient is awake alert in no acute distress  Lung clear to auscultation bilateral  Heart positive S1-S2 with murmur  Abdomen soft nontender  Lower extremities no edema    Additional Data:     Labs:    Results from last 7 days   Lab Units 20  0624   WBC Thousand/uL 4 72   HEMOGLOBIN g/dL 9 6*   HEMATOCRIT % 31 4*   PLATELETS Thousands/uL 126*   NEUTROS PCT % 73   LYMPHS PCT % 14   MONOS PCT % 8   EOS PCT % 5     Results from last 7 days   Lab Units 12/31/19  0458  12/29/19 1919 12/29/19 1908   POTASSIUM mmol/L 3 4*   < > 3 9  --    CHLORIDE mmol/L 115*   < > 105  --    CO2 mmol/L 20*   < > 26  --    CO2, I-STAT mmol/L  --   --   --  26   BUN mg/dL 14   < > 28*  --    CREATININE mg/dL 0 67   < > 1 13  --    CALCIUM mg/dL 8 7   < > 9 7  --    ALK PHOS U/L  --   --  72  --    ALT U/L  --   --  20  --    AST U/L  --   --  30  --    GLUCOSE, ISTAT mg/dl  --   --   --  140    < > = values in this interval not displayed  Results from last 7 days   Lab Units 12/29/19 1919   INR  1 04       * I Have Reviewed All Lab Data Listed Above  * Additional Pertinent Lab Tests Reviewed:  Michael 66 Admission Reviewed    Imaging:    Imaging Reports Reviewed Today Include:   Imaging Personally Reviewed by Myself Includes:    Recent Cultures (last 7 days):     Results from last 7 days   Lab Units 12/29/19 2125 12/29/19 1924 12/29/19 1919   BLOOD CULTURE   --  No Growth at 48 hrs   --    GRAM STAIN RESULT   --   --  Gram positive cocci in clusters*   URINE CULTURE  >100,000 cfu/ml Escherichia coli*  --   --        Last 24 Hours Medication List:     Current Facility-Administered Medications:  acetaminophen 650 mg Oral Q6H PRN Mercy Rutherford MD    aspirin 81 mg Oral Daily Mercy Rutherford MD    atenolol 50 mg Oral Daily Lolita English PA-C    cefTRIAXone 1,000 mg Intravenous Q24H Mercy Rutherford MD Last Rate: Stopped (01/01/20 0518)   cholecalciferol 1,000 Units Oral Daily Mercy Rutherford MD    enoxaparin 30 mg Subcutaneous Daily Mercy Rutherford MD    hydrALAZINE 5 mg Intravenous Q6H PRN Jessica Looney DO    lactase 9,000 Units Oral TID With Meals Natalia Kahn PA-C    loperamide 2 mg Oral 4x Daily PRN Mercy Rutherford MD    [START ON 1/2/2020] loperamide 2 mg Oral Daily Jessica Looney DO    multivitamin stress formula 1 tablet Oral Daily Mercy Rutherford MD    ondansetron 4 mg Intravenous Q6H PRN Bill Monique MD    potassium chloride 10 mEq Oral Daily Bill Monique MD    pravastatin 20 mg Oral Daily With Isabella Hinojosa MD    saccharomyces boulardii 250 mg Oral BID Archana Wong PA-C    sodium chloride (PF) 3 mL Intravenous PRN Dom Shell MD    timolol 1 drop Both Eyes BID Bill Monique MD         Today, Patient Was Seen By: Kleber Patterson DO    ** Please Note: This note has been constructed using a voice recognition system   **

## 2020-01-02 LAB
ANION GAP SERPL CALCULATED.3IONS-SCNC: 5 MMOL/L (ref 4–13)
BUN SERPL-MCNC: 15 MG/DL (ref 5–25)
CALCIUM SERPL-MCNC: 9 MG/DL (ref 8.3–10.1)
CHLORIDE SERPL-SCNC: 113 MMOL/L (ref 100–108)
CO2 SERPL-SCNC: 23 MMOL/L (ref 21–32)
CREAT SERPL-MCNC: 0.84 MG/DL (ref 0.6–1.3)
GFR SERPL CREATININE-BSD FRML MDRD: 60 ML/MIN/1.73SQ M
GLUCOSE SERPL-MCNC: 86 MG/DL (ref 65–140)
POTASSIUM SERPL-SCNC: 4 MMOL/L (ref 3.5–5.3)
SODIUM SERPL-SCNC: 141 MMOL/L (ref 136–145)

## 2020-01-02 PROCEDURE — 97535 SELF CARE MNGMENT TRAINING: CPT

## 2020-01-02 PROCEDURE — 99232 SBSQ HOSP IP/OBS MODERATE 35: CPT | Performed by: INTERNAL MEDICINE

## 2020-01-02 PROCEDURE — 80048 BASIC METABOLIC PNL TOTAL CA: CPT | Performed by: INTERNAL MEDICINE

## 2020-01-02 RX ORDER — HYDROCHLOROTHIAZIDE 25 MG/1
25 TABLET ORAL DAILY
Status: DISCONTINUED | OUTPATIENT
Start: 2020-01-02 | End: 2020-01-07 | Stop reason: HOSPADM

## 2020-01-02 RX ORDER — SODIUM CHLORIDE, SODIUM GLUCONATE, SODIUM ACETATE, POTASSIUM CHLORIDE, MAGNESIUM CHLORIDE, SODIUM PHOSPHATE, DIBASIC, AND POTASSIUM PHOSPHATE .53; .5; .37; .037; .03; .012; .00082 G/100ML; G/100ML; G/100ML; G/100ML; G/100ML; G/100ML; G/100ML
50 INJECTION, SOLUTION INTRAVENOUS CONTINUOUS
Status: DISCONTINUED | OUTPATIENT
Start: 2020-01-02 | End: 2020-01-04

## 2020-01-02 RX ADMIN — LACTASE TAB 3000 UNIT 9000 UNITS: 3000 TAB at 08:34

## 2020-01-02 RX ADMIN — TIMOLOL MALEATE 1 DROP: 5 SOLUTION/ DROPS OPHTHALMIC at 08:35

## 2020-01-02 RX ADMIN — POTASSIUM CHLORIDE 10 MEQ: 750 TABLET, EXTENDED RELEASE ORAL at 08:34

## 2020-01-02 RX ADMIN — MELATONIN 1000 UNITS: at 08:34

## 2020-01-02 RX ADMIN — ZINC 1 TABLET: TAB ORAL at 08:35

## 2020-01-02 RX ADMIN — ENOXAPARIN SODIUM 30 MG: 30 INJECTION SUBCUTANEOUS at 08:35

## 2020-01-02 RX ADMIN — PRAVASTATIN SODIUM 20 MG: 20 TABLET ORAL at 17:38

## 2020-01-02 RX ADMIN — CEFAZOLIN SODIUM 1000 MG: 1 SOLUTION INTRAVENOUS at 06:04

## 2020-01-02 RX ADMIN — ACETAMINOPHEN 650 MG: 325 TABLET ORAL at 13:01

## 2020-01-02 RX ADMIN — CEFAZOLIN SODIUM 1000 MG: 1 SOLUTION INTRAVENOUS at 17:51

## 2020-01-02 RX ADMIN — Medication 250 MG: at 08:34

## 2020-01-02 RX ADMIN — TIMOLOL MALEATE 1 DROP: 5 SOLUTION/ DROPS OPHTHALMIC at 17:39

## 2020-01-02 RX ADMIN — Medication 250 MG: at 17:38

## 2020-01-02 RX ADMIN — LOPERAMIDE HYDROCHLORIDE 2 MG: 2 CAPSULE ORAL at 08:35

## 2020-01-02 RX ADMIN — HYDROCHLOROTHIAZIDE 25 MG: 25 TABLET ORAL at 13:01

## 2020-01-02 RX ADMIN — LACTASE TAB 3000 UNIT 9000 UNITS: 3000 TAB at 11:27

## 2020-01-02 RX ADMIN — LACTASE TAB 3000 UNIT 9000 UNITS: 3000 TAB at 17:39

## 2020-01-02 RX ADMIN — ASPIRIN 81 MG 81 MG: 81 TABLET ORAL at 08:34

## 2020-01-02 RX ADMIN — SODIUM CHLORIDE, SODIUM GLUCONATE, SODIUM ACETATE, POTASSIUM CHLORIDE, MAGNESIUM CHLORIDE, SODIUM PHOSPHATE, DIBASIC, AND POTASSIUM PHOSPHATE 50 ML/HR: .53; .5; .37; .037; .03; .012; .00082 INJECTION, SOLUTION INTRAVENOUS at 11:06

## 2020-01-02 RX ADMIN — ATENOLOL 50 MG: 50 TABLET ORAL at 08:34

## 2020-01-02 NOTE — ASSESSMENT & PLAN NOTE
· Originally presented status post fall, seems mechanical based on report, patient unable to provide additional history regarding this  · Trauma workup negative  · Patient found to have  sepsis secondary to urinary tract infection  · PT/OT ok with return to facility

## 2020-01-02 NOTE — PLAN OF CARE
Problem: Potential for Falls  Goal: Patient will remain free of falls  Description  INTERVENTIONS:  - Assess patient frequently for physical needs  -  Identify cognitive and physical deficits and behaviors that affect risk of falls  -  [place] fall precautions as indicated by assessment   - Educate patient/family on patient safety including physical limitations  - Instruct patient to call for assistance with activity based on assessment  - Modify environment to reduce risk of injury  - Consider OT/PT consult to assist with strengthening/mobility  Outcome: Progressing     Problem: Prexisting or High Potential for Compromised Skin Integrity  Goal: Skin integrity is maintained or improved  Description  INTERVENTIONS:  - Identify patients at risk for skin breakdown  - Assess and monitor skin integrity  - Assess and monitor nutrition and hydration status  - Monitor labs   - Assess for incontinence   - Turn and reposition patient  - Assist with mobility/ambulation  - Relieve pressure over bony prominences  - Avoid friction and shearing  - Provide appropriate hygiene as needed including keeping skin clean and dry  - Evaluate need for skin moisturizer/barrier cream  - Collaborate with interdisciplinary team   - Patient/family teaching  - Consider wound care consult   Outcome: Progressing     Problem: Nutrition/Hydration-ADULT  Goal: Nutrient/Hydration intake appropriate for improving, restoring or maintaining nutritional needs  Description  Monitor and assess patient's nutrition/hydration status for malnutrition  Collaborate with interdisciplinary team and initiate plan and interventions as ordered  Monitor patient's weight and dietary intake as ordered or per policy  Utilize nutrition screening tool and intervene as necessary  Determine patient's food preferences and provide high-protein, high-caloric foods as appropriate       INTERVENTIONS:  - Monitor oral intake, urinary output, labs, and treatment plans  - Assess nutrition and hydration status and recommend course of action  - Evaluate amount of meals eaten  - Assist patient with eating if necessary   - Allow adequate time for meals  - Recommend/ encourage appropriate diets, oral nutritional supplements, and vitamin/mineral supplements  - Assess need for intravenous fluids  - Provide specific nutrition/hydration education as appropriate  - Include patient/family/caregiver in decisions related to nutrition   Outcome: Progressing     Problem: PAIN - ADULT  Goal: Verbalizes/displays adequate comfort level or baseline comfort level  Description  Interventions:  - Encourage patient to monitor pain and request assistance  - Assess pain using appropriate pain scale  - Administer analgesics based on type and severity of pain and evaluate response  - Implement non-pharmacological measures as appropriate and evaluate response  - Notify physician/advanced practitioner if interventions unsuccessful or patient reports new pain   Outcome: Progressing     Problem: INFECTION - ADULT  Goal: Absence or prevention of progression during hospitalization  Description  INTERVENTIONS:  - Assess and monitor for signs and symptoms of infection  - Monitor lab/diagnostic results  - Monitor all insertion sites, i e  indwelling lines, tubes, and drains  - Administer medications as ordered  - Instruct and encourage patient and family to use good hand hygiene technique  - Identify and instruct in appropriate isolation precautions for identified infection/condition   Outcome: Progressing     Problem: SAFETY ADULT  Goal: Patient will remain free of falls  Description  INTERVENTIONS:  - Assess patient frequently for physical needs  -  Identify cognitive and physical deficits and behaviors that affect risk of falls    -  [place] fall precautions as indicated by assessment   - Educate patient/family on patient safety including physical limitations  - Instruct patient to call for assistance with activity based on assessment  - Modify environment to reduce risk of injury  - Consider OT/PT consult to assist with strengthening/mobility  Outcome: Progressing  Goal: Maintain or return mobility status to optimal level  Description  INTERVENTIONS:  - Assess patient's baseline mobility status (ambulation, transfers, stairs, etc )    - Identify cognitive and physical deficits and behaviors that affect mobility  - Identify mobility aids required to assist with transfers and/or ambulation (gait belt, sit-to-stand, lift, walker, cane, etc )  - [place] fall precautions as indicated by assessment  - Record patient progress and toleration of activity level on Mobility SBAR; progress patient to next Phase/Stage  - Instruct patient to call for assistance with activity based on assessment  - Consider rehabilitation consult to assist with strengthening/weightbearing, etc   Outcome: Progressing     Problem: DISCHARGE PLANNING  Goal: Discharge to home or other facility with appropriate resources  Description  INTERVENTIONS:  - Identify barriers to discharge w/patient and caregiver  - Arrange for needed discharge resources and transportation as appropriate  - Identify discharge learning needs (meds, wound care, etc )  - Arrange for interpretive services to assist at discharge as needed  - Refer to Case Management Department for coordinating discharge planning if the patient needs post-hospital services based on physician/advanced practitioner order or complex needs related to functional status, cognitive ability, or social support system  Outcome: Progressing     Problem: Knowledge Deficit  Goal: Patient/family/caregiver demonstrates understanding of disease process, treatment plan, medications, and discharge instructions  Description  Complete learning assessment and assess knowledge base    Interventions:  - Provide teaching at level of understanding  - Provide teaching via preferred learning methods  Outcome: Progressing     Problem: NEUROSENSORY - ADULT  Goal: Achieves stable or improved neurological status  Description  INTERVENTIONS  - Monitor and report changes in neurological status  - Monitor vital signs such as temperature, blood pressure, glucose, and any other labs ordered   - Monitor for seizure activity and implement precautions if appropriate       Outcome: Progressing  Goal: Achieves maximal functionality and self care  Description  INTERVENTIONS  - Monitor swallowing and airway patency with patient fatigue and changes in neurological status  - Encourage and assist patient to increase activity and self care     - Encourage visually impaired, hearing impaired and aphasic patients to use assistive/communication devices  Outcome: Progressing     Problem: GASTROINTESTINAL - ADULT  Goal: Maintains or returns to baseline bowel function  Description  INTERVENTIONS:  - Assess bowel function  - Encourage oral fluids to ensure adequate hydration  - Administer IV fluids if ordered to ensure adequate hydration  - Administer ordered medications as needed  - Encourage mobilization and activity  - Consider nutritional services referral to assist patient with adequate nutrition and appropriate food choices  Outcome: Progressing     Problem: GENITOURINARY - ADULT  Goal: Maintains or returns to baseline urinary function  Description  INTERVENTIONS:  - Assess urinary function  - Encourage oral fluids to ensure adequate hydration if ordered  - Administer IV fluids as ordered to ensure adequate hydration  - Administer ordered medications as needed  - Offer frequent toileting  - Follow urinary retention protocol if ordered  Outcome: Progressing     Problem: METABOLIC, FLUID AND ELECTROLYTES - ADULT  Goal: Electrolytes maintained within normal limits  Description  INTERVENTIONS:  - Monitor labs and assess patient for signs and symptoms of electrolyte imbalances  - Administer electrolyte replacement as ordered  - Monitor response to electrolyte replacements, including repeat lab results as appropriate  - Instruct patient on fluid and nutrition as appropriate  Outcome: Progressing  Goal: Fluid balance maintained  Description  INTERVENTIONS:  - Monitor labs   - Monitor I/O and WT  - Instruct patient on fluid and nutrition as appropriate  - Assess for signs & symptoms of volume excess or deficit  Outcome: Progressing     Problem: SKIN/TISSUE INTEGRITY - ADULT  Goal: Skin integrity remains intact  Description  INTERVENTIONS  - Identify patients at risk for skin breakdown  - Assess and monitor skin integrity  - Assess and monitor nutrition and hydration status  - Monitor labs (i e  albumin)  - Assess for incontinence   - Turn and reposition patient  - Assist with mobility/ambulation  - Relieve pressure over bony prominences  - Avoid friction and shearing  - Provide appropriate hygiene as needed including keeping skin clean and dry  - Evaluate need for skin moisturizer/barrier cream  - Collaborate with interdisciplinary team (i e  Nutrition, Rehabilitation, etc )   - Patient/family teaching  Outcome: Progressing

## 2020-01-02 NOTE — RESTORATIVE TECHNICIAN NOTE
Restorative Specialist Mobility Note       Activity: Ambulate in room, Chair, Dangle, Stand at bedside(Educated/encouraged pt to ambulate with assistance 3-4 x's/day  Bed alarm on   Pt callbell, phone/tray within reach )     Assistive Device: Other (Comment)(HHA x2 back to bed )    Rosalind ZAMBRANO, Restorative Technician, [facility]

## 2020-01-02 NOTE — PROGRESS NOTES
Progress Note - Vani Mcdonnell 9/20/1925, 80 y o  female MRN: [id number]    [facility] Encounter: [id number]    Primary Care Provider: No primary care provider on file  Date and time admitted to hospital: 12/29/2019  6:52 PM        * Sepsis [facility])  Assessment & Plan  · Patient presented from nursing home s/p mechanical fall, trauma cleared, imaging negative  · Incidentally found to have a positive sepsis criteria of temperature maximum 101 respiratory 28 lactic acid 2 4 on admission  · Urinalysis was grossly positive for pyuria, bacteriuria, nitrate, most likely the source of infection is UTI  · Urine culture positive for E coli  · Blood culture 1/2 set  positive for gram-positive cocci in clusters, rule out contamination  · influenza and RSV PCR negative  · Afebrile and no leukocytosis  · Evaluated by ID  · IV antibiotic changed to IV cefazolin  · Follow on blood cultures  · PT/OT input appreciated--recommend return to [facility] with additional services  · Daughter report history of CHF, check cardiac echo, monitor volume status      Urinary tract infection  Assessment & Plan  · Secondary to E coli  · ID input appreciated  · History of C diff colitis  · Currently on IV cefazolin    Fall  Assessment & Plan  · Originally presented status post fall, seems mechanical based on report, patient unable to provide additional history regarding this  · Trauma workup negative  · Patient found to have  sepsis secondary to urinary tract infection  · PT/OT ok with return to facility       Lactose intolerance  Assessment & Plan  · Lactose restricted diet   · added Lactaid pills t i d  Protein-calorie malnutrition, moderate (HCC)  Assessment & Plan  Malnutrition Findings:        muscle wasting    BMI Findings: Body mass index is 28 38 kg/m²     · Patient is 47 7 kilos, low albumin  · Nutritionist consult      Hypertensive urgency  Assessment & Plan  · Blood pressure on admission 194/100  · Improving  · Continue atenolol, restart HCTZ  · Monitor        VTE Pharmacologic Prophylaxis:   Pharmacologic: Enoxaparin (Lovenox)  Mechanical VTE Prophylaxis in Place: Yes    Patient Centered Rounds: I have performed bedside rounds with nursing staff today  Discussions with Specialists or Other Care Team Provider:     Education and Discussions with Family / Patient:  Discussed with patient's daughter Sanjay by the phone    Time Spent for Care: 30 minutes  More than 50% of total time spent on counseling and coordination of care as described above  Current Length of Stay: 4 day(s)    Current Patient Status: Inpatient   Certification Statement: The patient will continue to require additional inpatient hospital stay due to Above    Discharge Plan / Estimated Discharge Date: TBD    Code Status: Level 3 - DNAR and DNI      Subjective:   Patient seen and examined  Comfortable sitting in chair  No event overnight  Denied pain      Objective:     Vitals:   Temp (24hrs), Av 6 °F (36 4 °C), Min:97 6 °F (36 4 °C), Max:97 6 °F (36 4 °C)    Temp:  [97 6 °F (36 4 °C)] 97 6 °F (36 4 °C)  HR:  [60-68] 60  Resp:  [16] 16  BP: (147-160)/(53-59) 160/59  SpO2:  [94 %] 94 %  Body mass index is 28 38 kg/m²  Input and Output Summary (last 24 hours):        Intake/Output Summary (Last 24 hours) at 2020 1218  Last data filed at 2020 1815  Gross per 24 hour   Intake 280 ml   Output    Net 280 ml       Physical Exam:     Physical Exam  Patient is awake alert in no acute distress  Chronically ill-appearing  Lung clear to auscultation bilateral anteriorly  Heart positive S1-S2  with murmur  Abdomen soft nontender  Lower extremities no edema  Additional Data:     Labs:    Results from last 7 days   Lab Units 20  0624   WBC Thousand/uL 4 72   HEMOGLOBIN g/dL 9 6*   HEMATOCRIT % 31 4*   PLATELETS Thousands/uL 126*   NEUTROS PCT % 73   LYMPHS PCT % 14   MONOS PCT % 8   EOS PCT % 5     Results from last 7 days Lab Units 01/02/20  0604  12/29/19 1919 12/29/19 1908   POTASSIUM mmol/L 4 0   < > 3 9  --    CHLORIDE mmol/L 113*   < > 105  --    CO2 mmol/L 23   < > 26  --    CO2, I-STAT mmol/L  --   --   --  26   BUN mg/dL 15   < > 28*  --    CREATININE mg/dL 0 84   < > 1 13  --    CALCIUM mg/dL 9 0   < > 9 7  --    ALK PHOS U/L  --   --  72  --    ALT U/L  --   --  20  --    AST U/L  --   --  30  --    GLUCOSE, ISTAT mg/dl  --   --   --  140    < > = values in this interval not displayed  Results from last 7 days   Lab Units 12/29/19 1919   INR  1 04       * I Have Reviewed All Lab Data Listed Above  * Additional Pertinent Lab Tests Reviewed:  Michael 66 Admission Reviewed    Imaging:    Imaging Reports Reviewed Today Include:   Imaging Personally Reviewed by Myself Includes:     Recent Cultures (last 7 days):     Results from last 7 days   Lab Units 12/29/19 2125 12/29/19 1924 12/29/19 1919   BLOOD CULTURE   --  No Growth at 72 hrs   --    GRAM STAIN RESULT   --   --  Gram positive cocci in clusters*   URINE CULTURE  >100,000 cfu/ml Escherichia coli*  --   --        Last 24 Hours Medication List:     Current Facility-Administered Medications:  acetaminophen 650 mg Oral Q6H PRN Flordia Harada, MD    aspirin 81 mg Oral Daily Flordia Harada, MD    atenolol 50 mg Oral Daily Mortimer Barrs, PA-C    cefazolin 1,000 mg Intravenous Q12H Raji Vazquez MD Last Rate: 1,000 mg (01/02/20 0604)   cholecalciferol 1,000 Units Oral Daily Flordia Harada, MD    enoxaparin 30 mg Subcutaneous Daily Flordia Harada, MD    hydrALAZINE 5 mg Intravenous Q6H PRN Arland Nova, DO    hydrochlorothiazide 25 mg Oral Daily Evie Nova, DO    lactase 9,000 Units Oral TID With Meals Angelina English PA-C    loperamide 2 mg Oral 4x Daily PRN Flordia Harada, MD    loperamide 2 mg Oral Daily Arland Nova, DO    multi-electrolyte 50 mL/hr Intravenous Continuous Evie Nova,  Last Rate: 50 mL/hr (01/02/20 1106) multivitamin stress formula 1 tablet Oral Daily Mina Esposito MD    ondansetron 4 mg Intravenous Q6H PRN Mina Esposito MD    potassium chloride 10 mEq Oral Daily Mina Esposito MD    pravastatin 20 mg Oral Daily With Chiquita Bonilla MD    saccharomyces boulardii 250 mg Oral BID Kj English PA-C    sodium chloride (PF) 3 mL Intravenous PRN Sharona Berger MD    timolol 1 drop Both Eyes BID Mian Esposito MD         Today, Patient Was Seen By: Carissa Miles DO    ** Please Note: This note has been constructed using a voice recognition system   **

## 2020-01-02 NOTE — PLAN OF CARE
Problem: OCCUPATIONAL THERAPY ADULT  Goal: Performs self-care activities at highest level of function for planned discharge setting  See evaluation for individualized goals  Description  Treatment Interventions: ADL retraining, Functional transfer training, Endurance training, Cognitive reorientation, Patient/family training, Compensatory technique education, Energy conservation, Activityengagement          See flowsheet documentation for full assessment, interventions and recommendations  Outcome: Progressing  Note:   Limitation: Decreased ADL status, Decreased UE ROM, Decreased UE strength, Decreased endurance, Decreased cognition, Decreased self-care trans, Decreased high-level ADLs     Assessment: Pt participated in occupational therapy with focus on activity tolerance, bed mob, unsupported sitting balance and tolerance for pt engagement in functional self-care task/oral care, functional transfers/stand pivot transfers  Pt cleared by DARLEEN/Jenifer for pt participation in occupational therapy  Pt received HOB raised/supine pt sitting upright and pt Identifiers confirmed  Pt unable to report her goal today 2* pt cognitive impairments  Pt requires assist for bed mob, functional transfers/stand pivot transfers 2* pt decreased strength, coordination and balance  Pt able to complete brushing her teeth while seated edge of pt bed with min A to steady her unsupported sitting balance  Pt chair alarm active post session all needs within reach  OT Discharge Recommendation: Other (Comment)(PRICILA )  OT - OK to Discharge:  Yes

## 2020-01-02 NOTE — ASSESSMENT & PLAN NOTE
· Secondary to E coli  · ID input appreciated  · History of C diff colitis  · Currently on IV cefazolin

## 2020-01-02 NOTE — ASSESSMENT & PLAN NOTE
· Patient presented from nursing home s/p mechanical fall, trauma cleared, imaging negative  · Incidentally found to have a positive sepsis criteria of temperature maximum 101 respiratory 28 lactic acid 2 4 on admission  · Urinalysis was grossly positive for pyuria, bacteriuria, nitrate, most likely the source of infection is UTI      · Urine culture positive for E coli  · Blood culture 1/2 set  positive for gram-positive cocci in clusters, rule out contamination  · influenza and RSV PCR negative  · Afebrile and no leukocytosis  · Evaluated by ID  · IV antibiotic changed to IV cefazolin  · Follow on blood cultures  · PT/OT input appreciated--recommend return to [facility] with additional services  · Daughter report history of CHF, check cardiac echo, monitor volume status

## 2020-01-02 NOTE — OCCUPATIONAL THERAPY NOTE
Occupational Therapy Treatment Note     01/02/20 0843   Restrictions/Precautions   Weight Bearing Precautions Per Order No   Braces or Orthoses   (none)   Other Precautions Cognitive; Chair Alarm; Fall Risk   Lifestyle   Autonomy pt reports sleeping in a recliner chair  assisted with bathing, dressing, toileting and transfers   Reciprocal Relationships supportive staff, supportive daughter   Jason Morejon read books   Pain Assessment   Pain Assessment No/denies pain   Pain Score No Pain   ADL   Where Assessed Edge of bed   Grooming Assistance 4  Minimal Assistance   Grooming Deficit Teeth care   Grooming Comments while seated edge of pt bed    Toileting Assistance  2  Maximal Assistance   Toileting Deficit Perineal hygiene   Bed Mobility   Supine to Sit 3  Moderate assistance   Additional items Assist x 1   Transfers   Sit to Stand 4  Minimal assistance   Additional items Assist x 2   Stand to Sit 4  Minimal assistance   Additional items Assist x 2   Stand pivot 3  Moderate assistance   Additional items Assist x 2; Increased time required   Cognition   Overall Cognitive Status Impaired   Arousal/Participation Cooperative   Attention Attends with cues to redirect   Orientation Level Disoriented to time   Memory Decreased recall of precautions;Decreased recall of recent events;Decreased short term memory;Decreased recall of biographical information   Following Commands Follows one step commands with increased time or repetition   Assessment   Assessment Pt participated in occupational therapy with focus on activity tolerance, bed mob, unsupported sitting balance and tolerance for pt engagement in functional self-care task/oral care, functional transfers/stand pivot transfers  Pt cleared by DARLEEN/Jenifer for pt participation in occupational therapy  Pt received HOB raised/supine pt sitting upright and pt Identifiers confirmed  Pt unable to report her goal today 2* pt cognitive impairments    Pt requires assist for bed mob, functional transfers/stand pivot transfers 2* pt decreased strength, coordination and balance  Pt able to complete brushing her teeth while seated edge of pt bed with min A to steady her unsupported sitting balance  Pt chair alarm active post session all needs within reach      Plan   Treatment Interventions ADL retraining   Goal Expiration Date 01/13/20   OT Treatment Day 1   OT Frequency 2-3x/wk   Recommendation   OT Discharge Recommendation Other (Comment)  ([facility] )   Barthel Index   Feeding 5   Bathing 0   Grooming Score 0   Dressing Score 0   Bladder Score 5   Bowels Score 10   Toilet Use Score 5   Transfers (Bed/Chair) Score 5   Mobility (Level Surface) Score 0   Stairs Score 0   Barthel Index Score 30   Modified [place] Scale   Modified [place] Scale 4       Shani GOMEZ/L

## 2020-01-02 NOTE — PROGRESS NOTES
Progress Note - Infectious Disease   Iqra Alfred 80 y o  female MRN: [id number]  Unit/Bed#: [facility] 715-01 Encounter: [id number]      Impression:  1  Probable E coli UTI  2  Micrococcus lutis bacteremia versus contaminant  3  Seborrheic dermatitis of scalp     Recommendations:  Bacteremia versus contaminant  Patient is afebrile  Blood culture 1 of 2 shows micrococcus lutis  1  Discussed with primary service  2  Continue cefazolin 1 g q 12 hours IV for at least 72 hours and then will re-culture blood  3  Would discontinue vancomycin    E coli UTI  1  Continue cefazolin 1 g q 12 hours x 72 hours    Seborrheic dermatitis of scalp  1  Primary service will order appropriate shampoo    Antibiotics:  1  Cefazolin 1 g q 12 hours IV day 4 of 7 total antibiotic Rx    Subjective: The patient complains of head pain earlier  Denies fevers, chills, or sweats  Denies nausea, vomiting, or diarrhea  Objective:  Vitals:  Temp:  [97 6 °F (36 4 °C)] 97 6 °F (36 4 °C)  HR:  [59-68] 59  Resp:  [16-18] 18  BP: (125-160)/(53-97) 125/97  SpO2:  [94 %] 94 %  Temp (24hrs), Av 6 °F (36 4 °C), Min:97 6 °F (36 4 °C), Max:97 6 °F (36 4 °C)  Current: Temperature: 97 6 °F (36 4 °C)    Physical Exam:     General Appearance:  Alert elderly chronically ill-appearing, somewhat confused nontoxic, no acute distress  Has seborrheic changes of  scalp   Throat: Oropharynx moist without lesions  Lips, mucosa, and tongue normal, many missing teeth   Neck: Supple, symmetrical, trachea midline, no adenopathy,  no tenderness/mass/nodules   Lungs:   Clear to auscultation bilaterally, no audible wheezes, rhonchi or rales; respirations unlabored   Heart:  Regular rate and rhythm, S1, S2 normal, grade 1/6 systolic murmur and grade 2/6 diastolic murmur, no rub or gallop   Abdomen:   Soft, non-tender, non-distended, positive bowel sounds    No masses, no organomegaly    No CVA tenderness   Extremities: Extremities normal, atraumatic, no clubbing, cyanosis or edema   Skin: Scalp with seborrheic dermatitis changes         Invasive Devices     Peripheral Intravenous Line            Peripheral IV 01/02/20 Dorsal (posterior); Left Hand less than 1 day                Labs, Imaging, & Other studies:   All pertinent labs were personally reviewed  Results from last 7 days   Lab Units 01/01/20  0624 12/31/19 0458 12/30/19  0626   WBC Thousand/uL 4 72 4 50 6 80   HEMOGLOBIN g/dL 9 6* 9 1* 9 9*   PLATELETS Thousands/uL 126* 111* 136*     Results from last 7 days   Lab Units 01/02/20  0604 12/31/19  0458 12/30/19 0625 12/29/19 1919 12/29/19  1908   SODIUM mmol/L 141 142 141 137   < >  --    POTASSIUM mmol/L 4 0 3 4* 3 6 3 9   < >  --    CHLORIDE mmol/L 113* 115* 112* 105   < >  --    CO2 mmol/L 23 20* 22 26   < >  --    CO2, I-STAT mmol/L  --   --   --   --   --  26   BUN mg/dL 15 14 22 28*   < >  --    CREATININE mg/dL 0 84 0 67 0 78 1 13   < >  --    EGFR ml/min/1 73sq m 60 75 65 42   < >  --    GLUCOSE, ISTAT mg/dl  --   --   --   --   --  140   CALCIUM mg/dL 9 0 8 7 8 9 9 7   < >  --    AST U/L  --   --   --  30  --   --    ALT U/L  --   --   --  20  --   --    ALK PHOS U/L  --   --   --  72  --   --     < > = values in this interval not displayed  Results from last 7 days   Lab Units 12/29/19 2125 12/29/19 1924 12/29/19 1919   BLOOD CULTURE   --  No Growth at 72 hrs   Micrococcus luteus*   GRAM STAIN RESULT   --   --  Gram positive cocci in clusters*   URINE CULTURE  >100,000 cfu/ml Escherichia coli*  --   --

## 2020-01-03 ENCOUNTER — APPOINTMENT (INPATIENT)
Dept: NON INVASIVE DIAGNOSTICS | Facility: HOSPITAL | Age: 85
DRG: 872 | End: 2020-01-03
Attending: INTERNAL MEDICINE
Payer: MEDICARE

## 2020-01-03 PROBLEM — R13.10 DYSPHAGIA: Status: ACTIVE | Noted: 2020-01-03

## 2020-01-03 LAB
BACTERIA BLD CULT: ABNORMAL
BACTERIA BLD CULT: NORMAL
GRAM STN SPEC: ABNORMAL

## 2020-01-03 PROCEDURE — 99232 SBSQ HOSP IP/OBS MODERATE 35: CPT | Performed by: INTERNAL MEDICINE

## 2020-01-03 PROCEDURE — 97530 THERAPEUTIC ACTIVITIES: CPT

## 2020-01-03 PROCEDURE — 93306 TTE W/DOPPLER COMPLETE: CPT

## 2020-01-03 PROCEDURE — 93306 TTE W/DOPPLER COMPLETE: CPT | Performed by: INTERNAL MEDICINE

## 2020-01-03 PROCEDURE — 92610 EVALUATE SWALLOWING FUNCTION: CPT

## 2020-01-03 PROCEDURE — 99231 SBSQ HOSP IP/OBS SF/LOW 25: CPT | Performed by: INTERNAL MEDICINE

## 2020-01-03 RX ADMIN — MELATONIN 1000 UNITS: at 08:31

## 2020-01-03 RX ADMIN — POTASSIUM CHLORIDE 10 MEQ: 750 TABLET, EXTENDED RELEASE ORAL at 08:31

## 2020-01-03 RX ADMIN — PRAVASTATIN SODIUM 20 MG: 20 TABLET ORAL at 17:59

## 2020-01-03 RX ADMIN — ENOXAPARIN SODIUM 30 MG: 30 INJECTION SUBCUTANEOUS at 08:32

## 2020-01-03 RX ADMIN — CEFAZOLIN SODIUM 1000 MG: 1 SOLUTION INTRAVENOUS at 06:28

## 2020-01-03 RX ADMIN — LACTASE TAB 3000 UNIT 9000 UNITS: 3000 TAB at 12:52

## 2020-01-03 RX ADMIN — LOPERAMIDE HYDROCHLORIDE 2 MG: 2 CAPSULE ORAL at 08:31

## 2020-01-03 RX ADMIN — Medication 250 MG: at 08:31

## 2020-01-03 RX ADMIN — ASPIRIN 81 MG 81 MG: 81 TABLET ORAL at 08:31

## 2020-01-03 RX ADMIN — ZINC 1 TABLET: TAB ORAL at 08:38

## 2020-01-03 RX ADMIN — ACETAMINOPHEN 650 MG: 325 TABLET ORAL at 10:23

## 2020-01-03 RX ADMIN — TIMOLOL MALEATE 1 DROP: 5 SOLUTION/ DROPS OPHTHALMIC at 17:59

## 2020-01-03 RX ADMIN — LACTASE TAB 3000 UNIT 9000 UNITS: 3000 TAB at 18:00

## 2020-01-03 RX ADMIN — CEFAZOLIN SODIUM 1000 MG: 1 SOLUTION INTRAVENOUS at 17:59

## 2020-01-03 RX ADMIN — Medication 250 MG: at 17:59

## 2020-01-03 RX ADMIN — LACTASE TAB 3000 UNIT 9000 UNITS: 3000 TAB at 07:09

## 2020-01-03 RX ADMIN — HYDROCHLOROTHIAZIDE 25 MG: 25 TABLET ORAL at 08:31

## 2020-01-03 RX ADMIN — ATENOLOL 50 MG: 50 TABLET ORAL at 08:31

## 2020-01-03 RX ADMIN — TIMOLOL MALEATE 1 DROP: 5 SOLUTION/ DROPS OPHTHALMIC at 08:37

## 2020-01-03 NOTE — PHYSICAL THERAPY NOTE
Physical Therapy Progress Note     01/03/20 1044   Pain Assessment   Pain Assessment No/denies pain   Pain Score No Pain   Restrictions/Precautions   Weight Bearing Precautions Per Order No   Other Precautions Cognitive; Bed Alarm; Fall Risk   General   Family/Caregiver Present No   Cognition   Overall Cognitive Status Impaired   Arousal/Participation Alert; Uncooperative   Bed Mobility   Supine to Sit 2  Maximal assistance   Additional items Assist x 1; Increased time required;LE management;Verbal cues   Sit to Supine 2  Maximal assistance   Additional items Assist x 2   Transfers   Sit to Stand 2  Maximal assistance   Additional items Assist x 1   Stand to Sit 2  Maximal assistance   Additional items Assist x 1   Balance   Static Sitting Poor +   Static Standing Poor   Dynamic Standing Poor -   Ambulatory Zero   Activity Tolerance   Activity Tolerance Patient limited by fatigue   Nurse [address] to see per RN Jenifer   Assessment   Prognosis Fair   Problem List Decreased strength;Decreased endurance; Impaired balance;Decreased mobility; Decreased coordination;Decreased cognition; Impaired judgement;Decreased safety awareness   Assessment Pt is making slow progress with bed mobility and transfers  Requires max encouragement for full participation  Pt requires additional time today due to incontinence of urine and bowel  RN and PCA present to assist with cleanup  Max assist sit to stand transfers with cues for safety and technique  Limited by retropulsing when standing requiring max assist at times  Repositioned in supine with max assist of two with bed alarm active  Pt would benefit from continued physical therapy to maximize functional independence  Goals   Patient Goals To rest   STG Expiration Date 01/13/20   Short Term Goal #1 1  Pt will demonstrate ability to perform all aspects of bed mobility with Min A in order to increase independence and decrease burden on caregivers   2  Pt will demonstrate ability to perform functional transfers with Mind A in order to increase independence and decrease burden on caregivers  3  Pt will demonstrate ability to ambulate 25 ft with least restrictive AD with Min A in order to return to mobility safely  4  Pt will demonstrate improved balance by one grade order to decrease risk of falls  5  Pt will increase b/l LE strength by 1 grade in order to increase ease of functional mobility and transfers  PT Treatment Day 2   Plan   Treatment/Interventions Functional transfer training;LE strengthening/ROM; Therapeutic exercise; Endurance training;Cognitive reorientation;Patient/family training;Bed mobility;Gait training;Spoke to nursing   Progress Slow progress, decreased activity tolerance   PT Frequency   (3-5x/week)   Recommendation   Recommendation   (Return to facility with physical therapy)   Equipment Recommended Mat Prader; Wheelchair  (RW)     Henrique Deleon, PTA

## 2020-01-03 NOTE — ASSESSMENT & PLAN NOTE
· Patient presented from nursing home s/p mechanical fall, trauma cleared, imaging negative  · Incidentally found to have a positive sepsis criteria of temperature maximum 101 respiratory 28 lactic acid 2 4 on admission      · Urine culture positive for E coli  · Blood culture 1/2 set  positive for Micrococcus Luteus rule out contamination  · influenza and RSV PCR negative  · Afebrile and no leukocytosis  · Evaluated by ID  · On IV cefazolin  · Cardiac echo with EF 08%, grade 2 diastolic dysfunction and multiple valvular disease  · PT/OT input appreciated--recommend return to [facility] with additional services

## 2020-01-03 NOTE — PLAN OF CARE
Problem: Potential for Falls  Goal: Patient will remain free of falls  Description  INTERVENTIONS:  - Assess patient frequently for physical needs  -  Identify cognitive and physical deficits and behaviors that affect risk of falls  -  [place] fall precautions as indicated by assessment   - Educate patient/family on patient safety including physical limitations  - Instruct patient to call for assistance with activity based on assessment  - Modify environment to reduce risk of injury  - Consider OT/PT consult to assist with strengthening/mobility  Outcome: Progressing     Problem: Prexisting or High Potential for Compromised Skin Integrity  Goal: Skin integrity is maintained or improved  Description  INTERVENTIONS:  - Identify patients at risk for skin breakdown  - Assess and monitor skin integrity  - Assess and monitor nutrition and hydration status  - Monitor labs   - Assess for incontinence   - Turn and reposition patient  - Assist with mobility/ambulation  - Relieve pressure over bony prominences  - Avoid friction and shearing  - Provide appropriate hygiene as needed including keeping skin clean and dry  - Evaluate need for skin moisturizer/barrier cream  - Collaborate with interdisciplinary team   - Patient/family teaching  - Consider wound care consult   Outcome: Progressing     Problem: Nutrition/Hydration-ADULT  Goal: Nutrient/Hydration intake appropriate for improving, restoring or maintaining nutritional needs  Description  Monitor and assess patient's nutrition/hydration status for malnutrition  Collaborate with interdisciplinary team and initiate plan and interventions as ordered  Monitor patient's weight and dietary intake as ordered or per policy  Utilize nutrition screening tool and intervene as necessary  Determine patient's food preferences and provide high-protein, high-caloric foods as appropriate       INTERVENTIONS:  - Monitor oral intake, urinary output, labs, and treatment plans  - Assess nutrition and hydration status and recommend course of action  - Evaluate amount of meals eaten  - Assist patient with eating if necessary   - Allow adequate time for meals  - Recommend/ encourage appropriate diets, oral nutritional supplements, and vitamin/mineral supplements  - Assess need for intravenous fluids  - Provide specific nutrition/hydration education as appropriate  - Include patient/family/caregiver in decisions related to nutrition   Outcome: Progressing     Problem: PAIN - ADULT  Goal: Verbalizes/displays adequate comfort level or baseline comfort level  Description  Interventions:  - Encourage patient to monitor pain and request assistance  - Assess pain using appropriate pain scale  - Administer analgesics based on type and severity of pain and evaluate response  - Implement non-pharmacological measures as appropriate and evaluate response  - Notify physician/advanced practitioner if interventions unsuccessful or patient reports new pain   Outcome: Progressing     Problem: INFECTION - ADULT  Goal: Absence or prevention of progression during hospitalization  Description  INTERVENTIONS:  - Assess and monitor for signs and symptoms of infection  - Monitor lab/diagnostic results  - Monitor all insertion sites, i e  indwelling lines, tubes, and drains  - Administer medications as ordered  - Instruct and encourage patient and family to use good hand hygiene technique  - Identify and instruct in appropriate isolation precautions for identified infection/condition   Outcome: Progressing     Problem: SAFETY ADULT  Goal: Patient will remain free of falls  Description  INTERVENTIONS:  - Assess patient frequently for physical needs  -  Identify cognitive and physical deficits and behaviors that affect risk of falls    -  [place] fall precautions as indicated by assessment   - Educate patient/family on patient safety including physical limitations  - Instruct patient to call for assistance with activity based on assessment  - Modify environment to reduce risk of injury  - Consider OT/PT consult to assist with strengthening/mobility  Outcome: Progressing  Goal: Maintain or return mobility status to optimal level  Description  INTERVENTIONS:  - Assess patient's baseline mobility status (ambulation, transfers, stairs, etc )    - Identify cognitive and physical deficits and behaviors that affect mobility  - Identify mobility aids required to assist with transfers and/or ambulation (gait belt, sit-to-stand, lift, walker, cane, etc )  - [place] fall precautions as indicated by assessment  - Record patient progress and toleration of activity level on Mobility SBAR; progress patient to next Phase/Stage  - Instruct patient to call for assistance with activity based on assessment  - Consider rehabilitation consult to assist with strengthening/weightbearing, etc   Outcome: Progressing     Problem: DISCHARGE PLANNING  Goal: Discharge to home or other facility with appropriate resources  Description  INTERVENTIONS:  - Identify barriers to discharge w/patient and caregiver  - Arrange for needed discharge resources and transportation as appropriate  - Identify discharge learning needs (meds, wound care, etc )  - Arrange for interpretive services to assist at discharge as needed  - Refer to Case Management Department for coordinating discharge planning if the patient needs post-hospital services based on physician/advanced practitioner order or complex needs related to functional status, cognitive ability, or social support system  Outcome: Progressing     Problem: Knowledge Deficit  Goal: Patient/family/caregiver demonstrates understanding of disease process, treatment plan, medications, and discharge instructions  Description  Complete learning assessment and assess knowledge base    Interventions:  - Provide teaching at level of understanding  - Provide teaching via preferred learning methods  Outcome: Progressing     Problem: NEUROSENSORY - ADULT  Goal: Achieves stable or improved neurological status  Description  INTERVENTIONS  - Monitor and report changes in neurological status  - Monitor vital signs such as temperature, blood pressure, glucose, and any other labs ordered   - Monitor for seizure activity and implement precautions if appropriate       Outcome: Progressing  Goal: Achieves maximal functionality and self care  Description  INTERVENTIONS  - Monitor swallowing and airway patency with patient fatigue and changes in neurological status  - Encourage and assist patient to increase activity and self care     - Encourage visually impaired, hearing impaired and aphasic patients to use assistive/communication devices  Outcome: Progressing     Problem: GENITOURINARY - ADULT  Goal: Maintains or returns to baseline urinary function  Description  INTERVENTIONS:  - Assess urinary function  - Encourage oral fluids to ensure adequate hydration if ordered  - Administer IV fluids as ordered to ensure adequate hydration  - Administer ordered medications as needed  - Offer frequent toileting  - Follow urinary retention protocol if ordered  Outcome: Progressing     Problem: METABOLIC, FLUID AND ELECTROLYTES - ADULT  Goal: Electrolytes maintained within normal limits  Description  INTERVENTIONS:  - Monitor labs and assess patient for signs and symptoms of electrolyte imbalances  - Administer electrolyte replacement as ordered  - Monitor response to electrolyte replacements, including repeat lab results as appropriate  - Instruct patient on fluid and nutrition as appropriate  Outcome: Progressing  Goal: Fluid balance maintained  Description  INTERVENTIONS:  - Monitor labs   - Monitor I/O and WT  - Instruct patient on fluid and nutrition as appropriate  - Assess for signs & symptoms of volume excess or deficit  Outcome: Progressing     Problem: SKIN/TISSUE INTEGRITY - ADULT  Goal: Skin integrity remains intact  Description  INTERVENTIONS  - Identify patients at risk for skin breakdown  - Assess and monitor skin integrity  - Assess and monitor nutrition and hydration status  - Monitor labs (i e  albumin)  - Assess for incontinence   - Turn and reposition patient  - Assist with mobility/ambulation  - Relieve pressure over bony prominences  - Avoid friction and shearing  - Provide appropriate hygiene as needed including keeping skin clean and dry  - Evaluate need for skin moisturizer/barrier cream  - Collaborate with interdisciplinary team (i e  Nutrition, Rehabilitation, etc )   - Patient/family teaching  Outcome: Progressing     Problem: GASTROINTESTINAL - ADULT  Goal: Maintains or returns to baseline bowel function  Description  INTERVENTIONS:  - Assess bowel function  - Encourage oral fluids to ensure adequate hydration  - Administer IV fluids if ordered to ensure adequate hydration  - Administer ordered medications as needed  - Encourage mobilization and activity  - Consider nutritional services referral to assist patient with adequate nutrition and appropriate food choices  Outcome: Completed

## 2020-01-03 NOTE — PROGRESS NOTES
The patient continues to be confused and has a fever of 100 F  The patient was given tylenol as ordered and it was noted that she coughed a little bit after swallowing the second pill  Dr Alfredo Haynes was made aware of the patient's confusion, fever and intermittent difficulty with swallowing  Speech was consulted as ordered  Will continue to monitor

## 2020-01-03 NOTE — PROGRESS NOTES
Progress Note - Marva Dow 9/20/1925, 80 y o  female MRN: [id number]    Unit/Bed#: [facility] 715-01 Encounter: [id number]    Primary Care Provider: No primary care provider on file  Date and time admitted to hospital: 12/29/2019  6:52 PM        * Sepsis [facility])  Assessment & Plan  · Patient presented from nursing home s/p mechanical fall, trauma cleared, imaging negative  · Incidentally found to have a positive sepsis criteria of temperature maximum 101 respiratory 28 lactic acid 2 4 on admission  · Urine culture positive for E coli  · Blood culture 1/2 set  positive for Micrococcus Luteus rule out contamination  · influenza and RSV PCR negative  · Afebrile and no leukocytosis  · Evaluated by ID  · On IV cefazolin  · Cardiac echo with EF 56%, grade 2 diastolic dysfunction and multiple valvular disease  · PT/OT input appreciated--recommend return to [facility] with additional services        Urinary tract infection  Assessment & Plan  · Secondary to E coli  · ID input appreciated  · History of C diff colitis  · Currently on IV cefazolin    Dysphagia  Assessment & Plan  Likely secondary to above  Speech is following   Diet was downgraded to pureed diet    Fall  Assessment & Plan  · Originally presented status post fall, seems mechanical based on report, patient unable to provide additional history regarding this  · Trauma workup negative  · Patient found to have  sepsis secondary to urinary tract infection  · PT/OT ok with return to facility       Lactose intolerance  Assessment & Plan  · Lactose restricted diet   · added Lactaid pills t i d  Protein-calorie malnutrition, moderate (HCC)  Assessment & Plan  Malnutrition Findings:        muscle wasting    BMI Findings: Body mass index is 28 38 kg/m²     · Patient is 47 7 kilos, low albumin        Hypertensive urgency  Assessment & Plan  · Blood pressure on admission 194/100  · Improving  · Continue atenolol and  HCTZ  · Monitor             VTE Pharmacologic Prophylaxis:   Pharmacologic: Enoxaparin (Lovenox)  Mechanical VTE Prophylaxis in Place: Yes    Patient Centered Rounds: I have performed bedside rounds with nursing staff today  Discussions with Specialists or Other Care Team Provider:  ID    Education and Discussions with Family / Patient:  Antonietta Radu to reach daughter by phone    Time Spent for Care: 30 minutes  More than 50% of total time spent on counseling and coordination of care as described above  Current Length of Stay: 5 day(s)    Current Patient Status: Inpatient   Certification Statement: The patient will continue to require additional inpatient hospital stay due to above    Discharge Plan / Estimated Discharge Date: TBD    Code Status: Level 3 - DNAR and DNI      Subjective:   Patient seen examined  Comfortable in bed  More confused and disoriented  Nursing report patient not swallowing food well  Speech therapy consulted    Objective:     Vitals:   Temp (24hrs), Av 7 °F (37 6 °C), Min:99 3 °F (37 4 °C), Max:100 °F (37 8 °C)    Temp:  [99 3 °F (37 4 °C)-100 °F (37 8 °C)] 100 °F (37 8 °C)  HR:  [51-74] 57  Resp:  [18] 18  BP: (125-156)/(52-97) 150/52  SpO2:  [92 %-99 %] 96 %  Body mass index is 28 38 kg/m²  Input and Output Summary (last 24 hours):        Intake/Output Summary (Last 24 hours) at 1/3/2020 1429  Last data filed at 1/3/2020 1112  Gross per 24 hour   Intake 702 5 ml   Output    Net 702 5 ml       Physical Exam:     Physical Exam  Patient is awake in no acute distress  Pleasantly disoriented  Lung clear to auscultation bilateral  Heart positive S1-S2 with murmur  Abdomen soft nontender positive bowel sounds  Lower extremities no edema    Additional Data:     Labs:    Results from last 7 days   Lab Units 20  0624   WBC Thousand/uL 4 72   HEMOGLOBIN g/dL 9 6*   HEMATOCRIT % 31 4*   PLATELETS Thousands/uL 126*   NEUTROS PCT % 73   LYMPHS PCT % 14   MONOS PCT % 8   EOS PCT % 5     Results from last 7 days   Lab Units 01/02/20  0604  12/29/19 1919 12/29/19 1908   POTASSIUM mmol/L 4 0   < > 3 9  --    CHLORIDE mmol/L 113*   < > 105  --    CO2 mmol/L 23   < > 26  --    CO2, I-STAT mmol/L  --   --   --  26   BUN mg/dL 15   < > 28*  --    CREATININE mg/dL 0 84   < > 1 13  --    CALCIUM mg/dL 9 0   < > 9 7  --    ALK PHOS U/L  --   --  72  --    ALT U/L  --   --  20  --    AST U/L  --   --  30  --    GLUCOSE, ISTAT mg/dl  --   --   --  140    < > = values in this interval not displayed  Results from last 7 days   Lab Units 12/29/19 1919   INR  1 04       * I Have Reviewed All Lab Data Listed Above  * Additional Pertinent Lab Tests Reviewed: Michael 66 Admission Reviewed    Imaging:    Imaging Reports Reviewed Today Include:  Imaging Personally Reviewed by Myself Includes:     Recent Cultures (last 7 days):     Results from last 7 days   Lab Units 12/29/19 2125 12/29/19 1924 12/29/19 1919   BLOOD CULTURE   --  No Growth After 4 Days   Micrococcus luteus*   GRAM STAIN RESULT   --   --  Gram positive cocci in clusters*   URINE CULTURE  >100,000 cfu/ml Escherichia coli*  --   --        Last 24 Hours Medication List:     Current Facility-Administered Medications:  acetaminophen 650 mg Oral Q6H PRN Prashanth Lock MD    aspirin 81 mg Oral Daily Prashanth Lock MD    atenolol 50 mg Oral Daily Lauryn Gardner PA-C    cefazolin 1,000 mg Intravenous Q12H Gonzalez Busby MD Last Rate: 1,000 mg (01/03/20 5228)   cholecalciferol 1,000 Units Oral Daily Prashanth Lock MD    enoxaparin 30 mg Subcutaneous Daily Prashanth Lock MD    hydrALAZINE 5 mg Intravenous Q6H PRN Angie Herring, DO    hydrochlorothiazide 25 mg Oral Daily Angie Herring, DO    lactase 9,000 Units Oral TID With Meals Brooke English PA-C    loperamide 2 mg Oral 4x Daily PRN Prashanth Lock MD    loperamide 2 mg Oral Daily Angie Herring, DO    multi-electrolyte 50 mL/hr Intravenous Continuous Angie Herring, DO Last Rate: 50 mL/hr (01/02/20 5485) multivitamin stress formula 1 tablet Oral Daily Pietro Romero MD    ondansetron 4 mg Intravenous Q6H PRN Pietro Romero MD    potassium chloride 10 mEq Oral Daily Pietro Romero MD    pravastatin 20 mg Oral Daily With Audrey Funk MD    saccharomyces boulardii 250 mg Oral BID Jeyson English PA-C    sodium chloride (PF) 3 mL Intravenous PRN Rc Reno MD    timolol 1 drop Both Eyes BID Pietro Romero MD         Today, Patient Was Seen By: Derick Emmanuel DO    ** Please Note: This note has been constructed using a voice recognition system   **

## 2020-01-03 NOTE — ASSESSMENT & PLAN NOTE
Malnutrition Findings:        muscle wasting    BMI Findings: Body mass index is 28 38 kg/m²     · Patient is 47 7 kilos, low albumin

## 2020-01-03 NOTE — PLAN OF CARE
Problem: Nutrition/Hydration-ADULT  Goal: Nutrient/Hydration intake appropriate for improving, restoring or maintaining nutritional needs  Description  Monitor and assess patient's nutrition/hydration status for malnutrition  Collaborate with interdisciplinary team and initiate plan and interventions as ordered  Monitor patient's weight and dietary intake as ordered or per policy  Utilize nutrition screening tool and intervene as necessary  Determine patient's food preferences and provide high-protein, high-caloric foods as appropriate       INTERVENTIONS:  - Monitor oral intake, urinary output, labs, and treatment plans  - Assess nutrition and hydration status and recommend course of action  - Evaluate amount of meals eaten  - Assist patient with eating if necessary   - Allow adequate time for meals  - Recommend/ encourage appropriate diets, oral nutritional supplements, and vitamin/mineral supplements  - Assess need for intravenous fluids  - Provide specific nutrition/hydration education as appropriate  - Include patient/family/caregiver in decisions related to nutrition   Outcome: Progressing

## 2020-01-03 NOTE — PROGRESS NOTES
The patient is awake and alert, but confused, irritable and oriented to person only at this time  The patient has a temp of 99 3 F axillary at this time  Dr Heather Prieto was made aware  No new orders were noted at this time  Will continue to monitor

## 2020-01-03 NOTE — PLAN OF CARE
Problem: PHYSICAL THERAPY ADULT  Goal: Performs mobility at highest level of function for planned discharge setting  See evaluation for individualized goals  Description  Treatment/Interventions: Functional transfer training, LE strengthening/ROM, Endurance training, Therapeutic exercise, Patient/family training, Equipment eval/education, Bed mobility, Gait training, Spoke to nursing  Equipment Recommended: Melanie Valadez, Wheelchair       See flowsheet documentation for full assessment, interventions and recommendations  Outcome: Progressing  Note:   Prognosis: Fair  Problem List: Decreased strength, Decreased endurance, Impaired balance, Decreased mobility, Decreased coordination, Decreased cognition, Impaired judgement, Decreased safety awareness  Assessment: Pt is making slow progress with bed mobility and transfers  Requires max encouragement for full participation  Pt requires additional time today due to incontinence of urine and bowel  RN and PCA present to assist with cleanup  Max assist sit to stand transfers with cues for safety and technique  Limited by retropulsing when standing requiring max assist at times  Repositioned in supine with max assist of two with bed alarm active  Pt would benefit from continued physical therapy to maximize functional independence  Recommendation: (Return to facility with physical therapy)     PT - OK to Discharge: Yes(once medically cleared)    See flowsheet documentation for full assessment

## 2020-01-03 NOTE — SPEECH THERAPY NOTE
Bedside Swallow Evaluation:    Summary:  Pt presents w/ mild-moderate oropharyngeal dysphagia characterized by prolonged, and somewhat inefficient mastication time with regular solids, with incoordinated and audible swallows and a coughing/slight choking episode with regular solids  RN reports a change in patient's mental status today, with slight fever  She was chewing her medications this am and had intermittent coughing with thin liquids  Dysphagia consult requested to assess for aspiration  Patient's baseline diet is regular and thin liquids  From H&P: Bacilio Leung is a 80 y o  female  [facility] resident who presented from facility after fall with head strike  Patient was evaluated by trauma and due to negative trauma workup admission under hospitalist service was recommended as patient found to have a temperature maximum in the emergency room of 38 3C and significantly elevated blood pressure  Upon my assessment patient was unable to verbalize circumstances of fall, she denies pain  Her initial lactic acid of 2 4 went down to 1 8 with IV hydration urinalysis was positive for pyuria, nitrates and bacteriuria  Patient remains hemodynamically stable so she admitted to the hospitalist service on an inpatient basis  ER RN confirmed with daughter that patient is DNR DNI    Recommendations:  Diet: Puree  Liquid: Nectar thick   Meds: Whole in puree or with nectar liquids   Supervision: Full   Positioning:Upright  Strategies: Pt to take PO/Meds only when fully alert and upright  Oral care: As needed   Aspiration precautions    Therapy Prognosis: Good  Prognosis considerations: No dysphagia at baseline  Frequency: 3-5x week, as able    Goal(s):  Pt will tolerate least restrictive diet w/out s/s aspiration or oral/pharyngeal difficulties       Consider consult w/:  None at this time     Reason for consult:  R/o aspiration  Determine safest and least restrictive diet  Change in mental status  Nursing reported cough w/ PO    Precautions:  None     Current diet:  Regular and thin liquids   Premorbid diet[de-identified]  Regular and thin liquids   Previous VBS:  None   O2 requirement:  None  Voice/Speech:  WFL  Social:  L[facility] at [facility]  Follows commands:  WFL                        Cognitive Status:  Awake and alert  Cooperative with some baseline dementia  Oral [facility] exam:  Dentition: WFL  Labial strength and ROM: WFL  Lingual strength and ROM: WFL  Mandibular strength and ROM: WFL    Items administered:  Puree, hard solid, nectar thick liquid  Liquids were taken by straw  Oral stage:  Lip closure: WFL  Mastication: Prolonged and inefficient with spitting out pieces of mejia  Bolus formation: Decreased  Bolus control: WFL  Transfer: Prolonged  Oral residue: Small amount spit out  Pocketing: No    Pharyngeal stage:  Swallow promptness: WFL  Laryngeal rise: WFL  Wet voice: No  Throat clear: No  Cough: With regular solids   Secondary swallows: With puree  Audible swallows:  With all    Esophageal stage:  No s/s reported    Aspiration precautions posted    Results d/w:  Pt, nursing, physician

## 2020-01-03 NOTE — PROGRESS NOTES
Progress Note - Infectious Disease   Vani Calvo 80 y o  female MRN: [id number]  Unit/Bed#: [facility] 715-01 Encounter: [id number]      Impression:  1  Probable E coli UTI  2  Micrococcus luteus bacteremia versus contaminant  3  Seborrheic dermatitis of scalp     Recommendations:  Bacteremia versus contaminant  Patient is afebrile  Blood culture 1 of 2 shows Micrococcus luteus  She has some confusion  1  Discussed with primary service  2  Continue cefazolin 1 g q 12 hours IV for at least 48 hours and then will re-culture blood      E coli UTI  1  Continue cefazolin 1 g q 12 hours x 48 hours    Seborrheic dermatitis of scalp  1  Primary service will order appropriate shampoo    Antibiotics:  1  Cefazolin 1 g q 12 hours IV day 5 of 7 total antibiotic Rx    Subjective:  Her only complaint is about the quality of the food  I do not want that mush    Denies fevers, chills, or sweats  Denies nausea, vomiting, or diarrhea  Objective:  Vitals:  Temp:  [98 5 °F (36 9 °C)-100 °F (37 8 °C)] 99 °F (37 2 °C)  HR:  [51-74] 58  Resp:  [18-20] 20  BP: (150-163)/(52-59) 163/59  SpO2:  [92 %-99 %] 96 %  Temp (24hrs), Av 2 °F (37 3 °C), Min:98 5 °F (36 9 °C), Max:100 °F (37 8 °C)  Current: Temperature: 99 °F (37 2 °C)    Physical Exam:     General Appearance:  Alert elderly chronically ill-appearing, somewhat confused nontoxic, no acute distress  Has seborrheic changes of  scalp   Throat: Oropharynx moist without lesions  Lips, mucosa, and tongue normal, many missing teeth   Neck: Supple, symmetrical, trachea midline, no adenopathy,  no tenderness/mass/nodules   Lungs:   Clear to auscultation bilaterally, no audible wheezes, rhonchi or rales; respirations unlabored   Heart:  Regular rate and rhythm, S1, S2 normal, grade 1/6 systolic murmur and grade 2/6 diastolic murmur, no rub or gallop   Abdomen:   Soft, non-tender, non-distended, positive bowel sounds    No masses, no organomegaly    No CVA tenderness   Extremities: Extremities normal, atraumatic, no clubbing, cyanosis or edema   Skin: Scalp with seborrheic dermatitis changes         Invasive Devices     Peripheral Intravenous Line            Peripheral IV 01/02/20 Dorsal (posterior); Left Hand 1 day                Labs, Imaging, & Other studies:   All pertinent labs were personally reviewed  Results from last 7 days   Lab Units 01/01/20  0624 12/31/19 0458 12/30/19  0626   WBC Thousand/uL 4 72 4 50 6 80   HEMOGLOBIN g/dL 9 6* 9 1* 9 9*   PLATELETS Thousands/uL 126* 111* 136*     Results from last 7 days   Lab Units 01/02/20  0604 12/31/19  0458 12/30/19 0625 12/29/19 1919 12/29/19 1908   SODIUM mmol/L 141 142 141 137   < >  --    POTASSIUM mmol/L 4 0 3 4* 3 6 3 9   < >  --    CHLORIDE mmol/L 113* 115* 112* 105   < >  --    CO2 mmol/L 23 20* 22 26   < >  --    CO2, I-STAT mmol/L  --   --   --   --   --  26   BUN mg/dL 15 14 22 28*   < >  --    CREATININE mg/dL 0 84 0 67 0 78 1 13   < >  --    EGFR ml/min/1 73sq m 60 75 65 42   < >  --    GLUCOSE, ISTAT mg/dl  --   --   --   --   --  140   CALCIUM mg/dL 9 0 8 7 8 9 9 7   < >  --    AST U/L  --   --   --  30  --   --    ALT U/L  --   --   --  20  --   --    ALK PHOS U/L  --   --   --  72  --   --     < > = values in this interval not displayed  Results from last 7 days   Lab Units 12/29/19 2125 12/29/19 1924 12/29/19 1919   BLOOD CULTURE   --  No Growth After 4 Days   Micrococcus luteus*   GRAM STAIN RESULT   --   --  Gram positive cocci in clusters*   URINE CULTURE  >100,000 cfu/ml Escherichia coli*  --   --

## 2020-01-04 LAB
ANION GAP SERPL CALCULATED.3IONS-SCNC: 4 MMOL/L (ref 4–13)
BASOPHILS # BLD AUTO: 0.02 THOUSANDS/ΜL (ref 0–0.1)
BASOPHILS NFR BLD AUTO: 0 % (ref 0–1)
BUN SERPL-MCNC: 14 MG/DL (ref 5–25)
CALCIUM SERPL-MCNC: 9 MG/DL (ref 8.3–10.1)
CHLORIDE SERPL-SCNC: 110 MMOL/L (ref 100–108)
CO2 SERPL-SCNC: 26 MMOL/L (ref 21–32)
CREAT SERPL-MCNC: 0.78 MG/DL (ref 0.6–1.3)
EOSINOPHIL # BLD AUTO: 0.24 THOUSAND/ΜL (ref 0–0.61)
EOSINOPHIL NFR BLD AUTO: 5 % (ref 0–6)
ERYTHROCYTE [DISTWIDTH] IN BLOOD BY AUTOMATED COUNT: 16.8 % (ref 11.6–15.1)
GFR SERPL CREATININE-BSD FRML MDRD: 65 ML/MIN/1.73SQ M
GLUCOSE SERPL-MCNC: 74 MG/DL (ref 65–140)
HCT VFR BLD AUTO: 29.6 % (ref 34.8–46.1)
HGB BLD-MCNC: 9.3 G/DL (ref 11.5–15.4)
IMM GRANULOCYTES # BLD AUTO: 0.01 THOUSAND/UL (ref 0–0.2)
IMM GRANULOCYTES NFR BLD AUTO: 0 % (ref 0–2)
LYMPHOCYTES # BLD AUTO: 0.85 THOUSANDS/ΜL (ref 0.6–4.47)
LYMPHOCYTES NFR BLD AUTO: 18 % (ref 14–44)
MAGNESIUM SERPL-MCNC: 2.1 MG/DL (ref 1.6–2.6)
MCH RBC QN AUTO: 28.7 PG (ref 26.8–34.3)
MCHC RBC AUTO-ENTMCNC: 31.4 G/DL (ref 31.4–37.4)
MCV RBC AUTO: 91 FL (ref 82–98)
MONOCYTES # BLD AUTO: 0.34 THOUSAND/ΜL (ref 0.17–1.22)
MONOCYTES NFR BLD AUTO: 7 % (ref 4–12)
NEUTROPHILS # BLD AUTO: 3.33 THOUSANDS/ΜL (ref 1.85–7.62)
NEUTS SEG NFR BLD AUTO: 70 % (ref 43–75)
NRBC BLD AUTO-RTO: 0 /100 WBCS
PHOSPHATE SERPL-MCNC: 3.2 MG/DL (ref 2.3–4.1)
PLATELET # BLD AUTO: 135 THOUSANDS/UL (ref 149–390)
PMV BLD AUTO: 10.4 FL (ref 8.9–12.7)
POTASSIUM SERPL-SCNC: 3.9 MMOL/L (ref 3.5–5.3)
RBC # BLD AUTO: 3.24 MILLION/UL (ref 3.81–5.12)
SODIUM SERPL-SCNC: 140 MMOL/L (ref 136–145)
WBC # BLD AUTO: 4.79 THOUSAND/UL (ref 4.31–10.16)

## 2020-01-04 PROCEDURE — 85025 COMPLETE CBC W/AUTO DIFF WBC: CPT | Performed by: INTERNAL MEDICINE

## 2020-01-04 PROCEDURE — 80048 BASIC METABOLIC PNL TOTAL CA: CPT | Performed by: INTERNAL MEDICINE

## 2020-01-04 PROCEDURE — 92526 ORAL FUNCTION THERAPY: CPT

## 2020-01-04 PROCEDURE — 99232 SBSQ HOSP IP/OBS MODERATE 35: CPT | Performed by: INTERNAL MEDICINE

## 2020-01-04 PROCEDURE — 83735 ASSAY OF MAGNESIUM: CPT | Performed by: INTERNAL MEDICINE

## 2020-01-04 PROCEDURE — 84100 ASSAY OF PHOSPHORUS: CPT | Performed by: INTERNAL MEDICINE

## 2020-01-04 PROCEDURE — 99231 SBSQ HOSP IP/OBS SF/LOW 25: CPT | Performed by: INTERNAL MEDICINE

## 2020-01-04 RX ORDER — LISINOPRIL 2.5 MG/1
2.5 TABLET ORAL DAILY
Status: DISCONTINUED | OUTPATIENT
Start: 2020-01-04 | End: 2020-01-05

## 2020-01-04 RX ADMIN — POTASSIUM CHLORIDE 10 MEQ: 750 TABLET, EXTENDED RELEASE ORAL at 08:06

## 2020-01-04 RX ADMIN — Medication 250 MG: at 08:06

## 2020-01-04 RX ADMIN — LISINOPRIL 2.5 MG: 2.5 TABLET ORAL at 13:02

## 2020-01-04 RX ADMIN — ATENOLOL 50 MG: 50 TABLET ORAL at 08:06

## 2020-01-04 RX ADMIN — ASPIRIN 81 MG 81 MG: 81 TABLET ORAL at 08:06

## 2020-01-04 RX ADMIN — LOPERAMIDE HYDROCHLORIDE 2 MG: 2 CAPSULE ORAL at 08:06

## 2020-01-04 RX ADMIN — ZINC 1 TABLET: TAB ORAL at 08:06

## 2020-01-04 RX ADMIN — LACTASE TAB 3000 UNIT 9000 UNITS: 3000 TAB at 17:02

## 2020-01-04 RX ADMIN — ACETAMINOPHEN 650 MG: 325 TABLET ORAL at 15:30

## 2020-01-04 RX ADMIN — HYDROCHLOROTHIAZIDE 25 MG: 25 TABLET ORAL at 08:06

## 2020-01-04 RX ADMIN — LACTASE TAB 3000 UNIT 9000 UNITS: 3000 TAB at 08:06

## 2020-01-04 RX ADMIN — HYDRALAZINE HYDROCHLORIDE 5 MG: 20 INJECTION INTRAMUSCULAR; INTRAVENOUS at 06:39

## 2020-01-04 RX ADMIN — CEFAZOLIN SODIUM 1000 MG: 1 SOLUTION INTRAVENOUS at 18:27

## 2020-01-04 RX ADMIN — TIMOLOL MALEATE 1 DROP: 5 SOLUTION/ DROPS OPHTHALMIC at 08:06

## 2020-01-04 RX ADMIN — ENOXAPARIN SODIUM 30 MG: 30 INJECTION SUBCUTANEOUS at 08:06

## 2020-01-04 RX ADMIN — CEFAZOLIN SODIUM 1000 MG: 1 SOLUTION INTRAVENOUS at 06:37

## 2020-01-04 RX ADMIN — Medication 250 MG: at 17:02

## 2020-01-04 RX ADMIN — TIMOLOL MALEATE 1 DROP: 5 SOLUTION/ DROPS OPHTHALMIC at 17:02

## 2020-01-04 RX ADMIN — LACTASE TAB 3000 UNIT 9000 UNITS: 3000 TAB at 13:02

## 2020-01-04 RX ADMIN — MELATONIN 1000 UNITS: at 08:06

## 2020-01-04 RX ADMIN — PRAVASTATIN SODIUM 20 MG: 20 TABLET ORAL at 17:02

## 2020-01-04 NOTE — PROGRESS NOTES
Progress Note - Infectious Disease   Lydia Patterson 80 y o  female MRN: [id number]  Unit/Bed#: [facility] 715-01 Encounter: [id number]      Impression:  1  Probable E coli UTI  2  Micrococcus luteus bacteremia versus contaminant  3  Seborrheic dermatitis of scalp     Recommendations:  Bacteremia versus contaminant  Patient is afebrile  Blood culture 1 of 2 shows Micrococcus luteus  She is not confused this evening  Had some semi loose stools  1  Discussed with primary service  2  Continue cefazolin 1 g q 12 hours IV for at least 24 hours and then will re-culture blood      E coli UTI  1  Continue cefazolin 1 g q 12 hours x 24 hours    Seborrheic dermatitis of scalp  1  Will see if we can obtain selenium sulfide shampoo    Antibiotics:  1  Cefazolin 1 g q 12 hours IV day 6 of 7 total antibiotic Rx    Subjective:  No specific complaints other than some soft stool today   Denies fevers, chills, or sweats  Denies nausea, vomiting,      Objective:  Vitals:  Temp:  [98 8 °F (37 1 °C)] 98 8 °F (37 1 °C)  HR:  [62-66] 62  Resp:  [18] 18  BP: (141-188)/(47-70) 141/47  SpO2:  [96 %-98 %] 98 %  Temp (24hrs), Av 8 °F (37 1 °C), Min:98 8 °F (37 1 °C), Max:98 8 °F (37 1 °C)  Current: Temperature: 98 8 °F (37 1 °C)    Physical Exam:     General Appearance:  Alert elderly chronically ill-appearing,  nontoxic, no acute distress  Has seborrheic changes of  scalp and face   Throat: Oropharynx moist without lesions  Lips, mucosa, and tongue normal, many missing teeth   Neck: Supple, symmetrical, trachea midline, no adenopathy,  no tenderness/mass/nodules   Lungs:   Clear to auscultation bilaterally, no audible wheezes, rhonchi or rales; respirations unlabored   Heart:  Regular rate and rhythm, S1, S2 normal, grade 1/6 systolic murmur and grade 2/6 diastolic murmur, no rub or gallop   Abdomen:   Soft, non-tender, non-distended, positive bowel sounds    No masses, no organomegaly    No CVA tenderness   Extremities: Extremities normal, atraumatic, no clubbing, cyanosis or edema   Skin: Scalp and face with seborrheic dermatitis changes         Invasive Devices     Peripheral Intravenous Line            Peripheral IV 01/02/20 Dorsal (posterior); Left Hand 2 days                Labs, Imaging, & Other studies:   All pertinent labs were personally reviewed  Results from last 7 days   Lab Units 01/04/20  0541 01/01/20  0624 12/31/19  0458   WBC Thousand/uL 4 79 4 72 4 50   HEMOGLOBIN g/dL 9 3* 9 6* 9 1*   PLATELETS Thousands/uL 135* 126* 111*     Results from last 7 days   Lab Units 01/04/20  0541 01/02/20  0604 12/31/19 0458  12/29/19 1919 12/29/19  1908   SODIUM mmol/L 140 141 142   < > 137  --    POTASSIUM mmol/L 3 9 4 0 3 4*   < > 3 9  --    CHLORIDE mmol/L 110* 113* 115*   < > 105  --    CO2 mmol/L 26 23 20*   < > 26  --    CO2, I-STAT mmol/L  --   --   --   --   --  26   BUN mg/dL 14 15 14   < > 28*  --    CREATININE mg/dL 0 78 0 84 0 67   < > 1 13  --    EGFR ml/min/1 73sq m 65 60 75   < > 42  --    GLUCOSE, ISTAT mg/dl  --   --   --   --   --  140   CALCIUM mg/dL 9 0 9 0 8 7   < > 9 7  --    AST U/L  --   --   --   --  30  --    ALT U/L  --   --   --   --  20  --    ALK PHOS U/L  --   --   --   --  72  --     < > = values in this interval not displayed  Results from last 7 days   Lab Units 12/29/19 2125 12/29/19 1924 12/29/19 1919   BLOOD CULTURE   --  No Growth After 5 Days   Micrococcus luteus*   GRAM STAIN RESULT   --   --  Gram positive cocci in clusters*   URINE CULTURE  >100,000 cfu/ml Escherichia coli*  --   --

## 2020-01-04 NOTE — PROGRESS NOTES
Progress Note - Ryan Lr 9/20/1925, 80 y o  female MRN: [id number]    Unit/Bed#: [facility] 715-01 Encounter: [id number]    Primary Care Provider: No primary care provider on file  Date and time admitted to hospital: 12/29/2019  6:52 PM        * Sepsis [facility])  Assessment & Plan  · Patient presented from nursing home s/p mechanical fall, trauma cleared, imaging negative  · Incidentally found to have a positive sepsis criteria of temperature maximum 101 respiratory 28 lactic acid 2 4 on admission  · Urine culture positive for E coli  · Blood culture 1/2 set  positive for Micrococcus Luteus rule out contamination  · influenza and RSV PCR negative  · Afebrile and no leukocytosis  · Evaluated by ID continue IV cefazolin for a total 7 days   · Cardiac echo with EF 99%, grade 2 diastolic dysfunction and multiple valvular disease  · PT/OT input appreciated--recommend return to [facility] with additional services        Urinary tract infection  Assessment & Plan  · Secondary to E coli  · ID input appreciated  · History of C diff colitis  · Currently on IV cefazolin    Dysphagia  Assessment & Plan  Likely secondary to above  Improving  Back to regular diet      Fall  Assessment & Plan  · Originally presented status post fall, seems mechanical based on report, patient unable to provide additional history regarding this  · Trauma workup negative  · Patient found to have  sepsis secondary to urinary tract infection  · PT/OT ok with return to facility       Lactose intolerance  Assessment & Plan  · Lactose restricted diet   · Continue Lactaid pills t i d      Hypertensive urgency  Assessment & Plan  · Blood pressure on admission 194/100  · Still not well controlled  · Continue atenolol and  HCTZ, add small dose lisinopril  · Monitor           VTE Pharmacologic Prophylaxis:   Pharmacologic: Enoxaparin (Lovenox)  Mechanical VTE Prophylaxis in Place: Yes    Patient Centered Rounds: I have performed bedside rounds with nursing staff today     Discussions with Specialists or Other Care Team Provider:     Education and Discussions with Family / Patient:  Discussed with patient's daughter who is requesting more skilled rehab    Time Spent for Care: 30 minutes  More than 50% of total time spent on counseling and coordination of care as described above  Current Length of Stay: 6 day(s)    Current Patient Status: Inpatient   Certification Statement: The patient will continue to require additional inpatient hospital stay due to Above    Discharge Plan / Estimated Discharge Date: 2 days    Code Status: Level 3 - DNAR and DNI      Subjective:   Patient seen and examined  Comfortable in bed  No event overnight  Denied pain    Objective:     Vitals:   Temp (24hrs), Av 8 °F (37 1 °C), Min:98 5 °F (36 9 °C), Max:99 °F (37 2 °C)    Temp:  [98 5 °F (36 9 °C)-99 °F (37 2 °C)] 98 8 °F (37 1 °C)  HR:  [56-66] 65  Resp:  [18-20] 18  BP: (156-188)/(55-70) 156/55  SpO2:  [95 %-99 %] 97 %  Body mass index is 28 38 kg/m²  Input and Output Summary (last 24 hours):        Intake/Output Summary (Last 24 hours) at 2020 1243  Last data filed at 2020 0800  Gross per 24 hour   Intake 100 ml   Output 273 ml   Net -173 ml       Physical Exam:     Physical Exam  Patient is awake alert in no acute distress  Lung clear to auscultation bilateral  Heart positive S1-S2 with murmur  Abdomen soft nontender  Lower extremities no edema    Additional Data:     Labs:    Results from last 7 days   Lab Units 20  0541   WBC Thousand/uL 4 79   HEMOGLOBIN g/dL 9 3*   HEMATOCRIT % 29 6*   PLATELETS Thousands/uL 135*   NEUTROS PCT % 70   LYMPHS PCT % 18   MONOS PCT % 7   EOS PCT % 5     Results from last 7 days   Lab Units 20  0541  19  1919 19  1908   POTASSIUM mmol/L 3 9   < > 3 9  --    CHLORIDE mmol/L 110*   < > 105  --    CO2 mmol/L 26   < > 26  --    CO2, I-STAT mmol/L  --   --   --  26   BUN mg/dL 14   < > 28*  --    CREATININE mg/dL 0 78   < > 1 13  --    CALCIUM mg/dL 9 0   < > 9 7  --    ALK PHOS U/L  --   --  72  --    ALT U/L  --   --  20  --    AST U/L  --   --  30  --    GLUCOSE, ISTAT mg/dl  --   --   --  140    < > = values in this interval not displayed  Results from last 7 days   Lab Units 12/29/19 1919   INR  1 04       * I Have Reviewed All Lab Data Listed Above  * Additional Pertinent Lab Tests Reviewed: Michael 66 Admission Reviewed    Imaging:    Imaging Reports Reviewed Today Include:   Imaging Personally Reviewed by Myself Includes:     Recent Cultures (last 7 days):     Results from last 7 days   Lab Units 12/29/19 2125 12/29/19 1924 12/29/19 1919   BLOOD CULTURE   --  No Growth After 5 Days   Micrococcus luteus*   GRAM STAIN RESULT   --   --  Gram positive cocci in clusters*   URINE CULTURE  >100,000 cfu/ml Escherichia coli*  --   --        Last 24 Hours Medication List:     Current Facility-Administered Medications:  acetaminophen 650 mg Oral Q6H PRN Cordelia Tapia MD    aspirin 81 mg Oral Daily Cordelia Tapia MD    atenolol 50 mg Oral Daily Moody Esteban PA-C    cefazolin 1,000 mg Intravenous Q12H Anuradha Dumas MD Last Rate: Stopped (01/04/20 0715)   cholecalciferol 1,000 Units Oral Daily Cordelia Tapia MD    enoxaparin 30 mg Subcutaneous Daily Cordelia Tapia MD    hydrALAZINE 5 mg Intravenous Q6H PRN Julia Schmidt, DO    hydrochlorothiazide 25 mg Oral Daily Julia cShmidt, DO    lactase 9,000 Units Oral TID With Meals Ned English PA-C    lisinopril 2 5 mg Oral Daily Julia Schmidt, DO    loperamide 2 mg Oral 4x Daily PRN Cordelia Tapia MD    loperamide 2 mg Oral Daily Julia Schmidt, DO    multivitamin stress formula 1 tablet Oral Daily Cordelia Tapia MD    ondansetron 4 mg Intravenous Q6H PRN Crodelia Tapia MD    potassium chloride 10 mEq Oral Daily Cordelia Tapia MD    pravastatin 20 mg Oral Daily With Nicolle Michele MD    saccharomyces boulardii 250 mg Oral BID Bartimmy Chan CARMELINA English    sodium chloride (PF) 3 mL Intravenous PRN Susie Nelson MD    timolol 1 drop Both Eyes BID Mercy Rutherford MD         Today, Patient Was Seen By: Jessica Looney DO    ** Please Note: This note has been constructed using a voice recognition system   **

## 2020-01-04 NOTE — ASSESSMENT & PLAN NOTE
· Blood pressure on admission 194/100  · Still not well controlled  · Continue atenolol and  HCTZ, add small dose lisinopril  · Monitor

## 2020-01-04 NOTE — SPEECH THERAPY NOTE
Speech Language/Pathology    Speech/Language Pathology Progress Note    Patient Name: Camila ROCHE Date: 1/4/2020     Problem List  Principal Problem:    Sepsis ([facility] [address] )  Active Problems:    Elevated lactic acid level    Hypertensive urgency    Protein-calorie malnutrition, moderate ([address] )    Fall    Urinary tract infection    Lactose intolerance    Dysphagia       Past Medical History  History reviewed  No pertinent past medical history  Past Surgical History  History reviewed  No pertinent surgical history  Subjective:  "Can I have some ice with this?"     Objective:  Patient continues on a puree diet with nectar thick liquids since evaluation yesterday  She reports dislike of puree  The patient is more awake and alert today, with more interactions with SLP  She is trialed with regular toast and thin liquids  The patient feeds herself  Bite size is adequate, with good bite strength  Mastication time is minimally prolonged, but efficient with no oral residue  The patient takes small sips of thin liquids via straw with no overt s/s aspiration  Assessment:  Patient has improved mental status today and tolerated trials well  Plan/Recommendations:  Recommend diet change back to regular with thin liquids  Please supervise patient with meals  S/W RN  Continue ST to further assess

## 2020-01-04 NOTE — ASSESSMENT & PLAN NOTE
· Patient presented from nursing home s/p mechanical fall, trauma cleared, imaging negative  · Incidentally found to have a positive sepsis criteria of temperature maximum 101 respiratory 28 lactic acid 2 4 on admission      · Urine culture positive for E coli  · Blood culture 1/2 set  positive for Micrococcus Luteus rule out contamination  · influenza and RSV PCR negative  · Afebrile and no leukocytosis  · Evaluated by ID continue IV cefazolin for a total 7 days   · Cardiac echo with EF 83%, grade 2 diastolic dysfunction and multiple valvular disease  · PT/OT input appreciated--recommend return to [facility] with additional services

## 2020-01-05 PROBLEM — R19.7 DIARRHEA: Status: ACTIVE | Noted: 2020-01-05

## 2020-01-05 PROCEDURE — 99231 SBSQ HOSP IP/OBS SF/LOW 25: CPT | Performed by: INTERNAL MEDICINE

## 2020-01-05 PROCEDURE — 99232 SBSQ HOSP IP/OBS MODERATE 35: CPT | Performed by: INTERNAL MEDICINE

## 2020-01-05 RX ORDER — LISINOPRIL 5 MG/1
5 TABLET ORAL DAILY
Status: DISCONTINUED | OUTPATIENT
Start: 2020-01-05 | End: 2020-01-07 | Stop reason: HOSPADM

## 2020-01-05 RX ADMIN — LOPERAMIDE HYDROCHLORIDE 2 MG: 2 CAPSULE ORAL at 07:48

## 2020-01-05 RX ADMIN — ATENOLOL 50 MG: 50 TABLET ORAL at 07:48

## 2020-01-05 RX ADMIN — HYDROCHLOROTHIAZIDE 25 MG: 25 TABLET ORAL at 07:48

## 2020-01-05 RX ADMIN — MELATONIN 1000 UNITS: at 07:49

## 2020-01-05 RX ADMIN — ENOXAPARIN SODIUM 30 MG: 30 INJECTION SUBCUTANEOUS at 07:47

## 2020-01-05 RX ADMIN — LISINOPRIL 2.5 MG: 2.5 TABLET ORAL at 07:47

## 2020-01-05 RX ADMIN — ZINC 1 TABLET: TAB ORAL at 07:48

## 2020-01-05 RX ADMIN — TIMOLOL MALEATE 1 DROP: 5 SOLUTION/ DROPS OPHTHALMIC at 07:47

## 2020-01-05 RX ADMIN — Medication 250 MG: at 07:48

## 2020-01-05 RX ADMIN — PRAVASTATIN SODIUM 20 MG: 20 TABLET ORAL at 16:56

## 2020-01-05 RX ADMIN — LACTASE TAB 3000 UNIT 9000 UNITS: 3000 TAB at 11:25

## 2020-01-05 RX ADMIN — Medication 250 MG: at 16:56

## 2020-01-05 RX ADMIN — TIMOLOL MALEATE 1 DROP: 5 SOLUTION/ DROPS OPHTHALMIC at 16:56

## 2020-01-05 RX ADMIN — LISINOPRIL 2.5 MG: 5 TABLET ORAL at 11:25

## 2020-01-05 RX ADMIN — LACTASE TAB 3000 UNIT 9000 UNITS: 3000 TAB at 16:55

## 2020-01-05 RX ADMIN — LACTASE TAB 3000 UNIT 9000 UNITS: 3000 TAB at 07:47

## 2020-01-05 RX ADMIN — ASPIRIN 81 MG 81 MG: 81 TABLET ORAL at 07:48

## 2020-01-05 RX ADMIN — CEFAZOLIN SODIUM 1000 MG: 1 SOLUTION INTRAVENOUS at 06:53

## 2020-01-05 RX ADMIN — POTASSIUM CHLORIDE 10 MEQ: 750 TABLET, EXTENDED RELEASE ORAL at 07:48

## 2020-01-05 NOTE — ASSESSMENT & PLAN NOTE
· Patient presented from nursing home s/p mechanical fall, trauma cleared, imaging negative  · Incidentally found to have a positive sepsis criteria of temperature maximum 101 respiratory 28 lactic acid 2 4 on admission      · Urine culture positive for E coli  · Blood culture 1/2 set  positive for Micrococcus Luteus rule out contamination  · influenza and RSV PCR negative  · Afebrile and no leukocytosis  · Evaluated by ID and treated with IV cefazolin  · Cardiac echo with EF 49%, grade 2 diastolic dysfunction and multiple valvular disease  · PT/OT input appreciated--recommend return to [facility] with additional services

## 2020-01-05 NOTE — PLAN OF CARE
Problem: Potential for Falls  Goal: Patient will remain free of falls  Description  INTERVENTIONS:  - Assess patient frequently for physical needs  -  Identify cognitive and physical deficits and behaviors that affect risk of falls  -  [place] fall precautions as indicated by assessment   - Educate patient/family on patient safety including physical limitations  - Instruct patient to call for assistance with activity based on assessment  - Modify environment to reduce risk of injury  - Consider OT/PT consult to assist with strengthening/mobility  Outcome: Progressing     Problem: Prexisting or High Potential for Compromised Skin Integrity  Goal: Skin integrity is maintained or improved  Description  INTERVENTIONS:  - Identify patients at risk for skin breakdown  - Assess and monitor skin integrity  - Assess and monitor nutrition and hydration status  - Monitor labs   - Assess for incontinence   - Turn and reposition patient  - Assist with mobility/ambulation  - Relieve pressure over bony prominences  - Avoid friction and shearing  - Provide appropriate hygiene as needed including keeping skin clean and dry  - Evaluate need for skin moisturizer/barrier cream  - Collaborate with interdisciplinary team   - Patient/family teaching  - Consider wound care consult   Outcome: Progressing     Problem: Nutrition/Hydration-ADULT  Goal: Nutrient/Hydration intake appropriate for improving, restoring or maintaining nutritional needs  Description  Monitor and assess patient's nutrition/hydration status for malnutrition  Collaborate with interdisciplinary team and initiate plan and interventions as ordered  Monitor patient's weight and dietary intake as ordered or per policy  Utilize nutrition screening tool and intervene as necessary  Determine patient's food preferences and provide high-protein, high-caloric foods as appropriate       INTERVENTIONS:  - Monitor oral intake, urinary output, labs, and treatment plans  - Assess nutrition and hydration status and recommend course of action  - Evaluate amount of meals eaten  - Assist patient with eating if necessary   - Allow adequate time for meals  - Recommend/ encourage appropriate diets, oral nutritional supplements, and vitamin/mineral supplements  - Assess need for intravenous fluids  - Provide specific nutrition/hydration education as appropriate  - Include patient/family/caregiver in decisions related to nutrition   Outcome: Progressing     Problem: PAIN - ADULT  Goal: Verbalizes/displays adequate comfort level or baseline comfort level  Description  Interventions:  - Encourage patient to monitor pain and request assistance  - Assess pain using appropriate pain scale  - Administer analgesics based on type and severity of pain and evaluate response  - Implement non-pharmacological measures as appropriate and evaluate response  - Notify physician/advanced practitioner if interventions unsuccessful or patient reports new pain   Outcome: Progressing     Problem: INFECTION - ADULT  Goal: Absence or prevention of progression during hospitalization  Description  INTERVENTIONS:  - Assess and monitor for signs and symptoms of infection  - Monitor lab/diagnostic results  - Monitor all insertion sites, i e  indwelling lines, tubes, and drains  - Administer medications as ordered  - Instruct and encourage patient and family to use good hand hygiene technique  - Identify and instruct in appropriate isolation precautions for identified infection/condition   Outcome: Progressing     Problem: SAFETY ADULT  Goal: Patient will remain free of falls  Description  INTERVENTIONS:  - Assess patient frequently for physical needs  -  Identify cognitive and physical deficits and behaviors that affect risk of falls    -  [place] fall precautions as indicated by assessment   - Educate patient/family on patient safety including physical limitations  - Instruct patient to call for assistance with activity based on assessment  - Modify environment to reduce risk of injury  - Consider OT/PT consult to assist with strengthening/mobility  Outcome: Progressing  Goal: Maintain or return mobility status to optimal level  Description  INTERVENTIONS:  - Assess patient's baseline mobility status (ambulation, transfers, stairs, etc )    - Identify cognitive and physical deficits and behaviors that affect mobility  - Identify mobility aids required to assist with transfers and/or ambulation (gait belt, sit-to-stand, lift, walker, cane, etc )  - [place] fall precautions as indicated by assessment  - Record patient progress and toleration of activity level on Mobility SBAR; progress patient to next Phase/Stage  - Instruct patient to call for assistance with activity based on assessment  - Consider rehabilitation consult to assist with strengthening/weightbearing, etc   Outcome: Progressing     Problem: DISCHARGE PLANNING  Goal: Discharge to home or other facility with appropriate resources  Description  INTERVENTIONS:  - Identify barriers to discharge w/patient and caregiver  - Arrange for needed discharge resources and transportation as appropriate  - Identify discharge learning needs (meds, wound care, etc )  - Arrange for interpretive services to assist at discharge as needed  - Refer to Case Management Department for coordinating discharge planning if the patient needs post-hospital services based on physician/advanced practitioner order or complex needs related to functional status, cognitive ability, or social support system  Outcome: Progressing     Problem: Knowledge Deficit  Goal: Patient/family/caregiver demonstrates understanding of disease process, treatment plan, medications, and discharge instructions  Description  Complete learning assessment and assess knowledge base    Interventions:  - Provide teaching at level of understanding  - Provide teaching via preferred learning methods  Outcome: Progressing     Problem: NEUROSENSORY - ADULT  Goal: Achieves stable or improved neurological status  Description  INTERVENTIONS  - Monitor and report changes in neurological status  - Monitor vital signs such as temperature, blood pressure, glucose, and any other labs ordered   - Monitor for seizure activity and implement precautions if appropriate       Outcome: Progressing  Goal: Achieves maximal functionality and self care  Description  INTERVENTIONS  - Monitor swallowing and airway patency with patient fatigue and changes in neurological status  - Encourage and assist patient to increase activity and self care     - Encourage visually impaired, hearing impaired and aphasic patients to use assistive/communication devices  Outcome: Progressing     Problem: GENITOURINARY - ADULT  Goal: Maintains or returns to baseline urinary function  Description  INTERVENTIONS:  - Assess urinary function  - Encourage oral fluids to ensure adequate hydration if ordered  - Administer IV fluids as ordered to ensure adequate hydration  - Administer ordered medications as needed  - Offer frequent toileting  - Follow urinary retention protocol if ordered  Outcome: Progressing     Problem: METABOLIC, FLUID AND ELECTROLYTES - ADULT  Goal: Electrolytes maintained within normal limits  Description  INTERVENTIONS:  - Monitor labs and assess patient for signs and symptoms of electrolyte imbalances  - Administer electrolyte replacement as ordered  - Monitor response to electrolyte replacements, including repeat lab results as appropriate  - Instruct patient on fluid and nutrition as appropriate  Outcome: Progressing  Goal: Fluid balance maintained  Description  INTERVENTIONS:  - Monitor labs   - Monitor I/O and WT  - Instruct patient on fluid and nutrition as appropriate  - Assess for signs & symptoms of volume excess or deficit  Outcome: Progressing     Problem: SKIN/TISSUE INTEGRITY - ADULT  Goal: Skin integrity remains intact  Description  INTERVENTIONS  - Identify patients at risk for skin breakdown  - Assess and monitor skin integrity  - Assess and monitor nutrition and hydration status  - Monitor labs (i e  albumin)  - Assess for incontinence   - Turn and reposition patient  - Assist with mobility/ambulation  - Relieve pressure over bony prominences  - Avoid friction and shearing  - Provide appropriate hygiene as needed including keeping skin clean and dry  - Evaluate need for skin moisturizer/barrier cream  - Collaborate with interdisciplinary team (i e  Nutrition, Rehabilitation, etc )   - Patient/family teaching  Outcome: Progressing

## 2020-01-05 NOTE — PROGRESS NOTES
Progress Note - Erinn Campbell 9/20/1925, 80 y o  female MRN: [id number]    Unit/Bed#: [facility] 715-01 Encounter: [id number]    Primary Care Provider: No primary care provider on file  Date and time admitted to hospital: 12/29/2019  6:52 PM        * Sepsis [facility])  Assessment & Plan  · Patient presented from nursing home s/p mechanical fall, trauma cleared, imaging negative  · Incidentally found to have a positive sepsis criteria of temperature maximum 101 respiratory 28 lactic acid 2 4 on admission  · Urine culture positive for E coli  · Blood culture 1/2 set  positive for Micrococcus Luteus rule out contamination  · influenza and RSV PCR negative  · Afebrile and no leukocytosis  · Evaluated by ID and treated with IV cefazolin  · Cardiac echo with EF 86%, grade 2 diastolic dysfunction and multiple valvular disease  · PT/OT input appreciated--recommend return to [facility] with additional services        Urinary tract infection  Assessment & Plan  · Secondary to E coli  · ID input appreciated  · History of C diff colitis  · Currently on IV cefazolin  · Discontinue due to diarrhea    Fall  Assessment & Plan  · Originally presented status post fall, seems mechanical based on report, patient unable to provide additional history regarding this  · Trauma workup negative  · Patient found to have  sepsis secondary to urinary tract infection  · PT/OT ok with return to facility       Diarrhea  Assessment & Plan  No abdominal pain, no leukocytosis  Hold cefazolin  Continue to monitor  Check C diff toxin if >3 diarrhea episodes      Lactose intolerance  Assessment & Plan  · Lactose restricted diet   · Continue Lactaid pills t i d  Protein-calorie malnutrition, moderate ([facility])  Assessment & Plan  Malnutrition Findings:        muscle wasting    BMI Findings: Body mass index is 28 38 kg/m²     · Patient is 47 7 kilos, low albumin        Hypertensive urgency  Assessment & Plan  · Blood pressure on admission 194/100  · Still not well controlled  · Continue atenolol and  HCTZ, increase lisinopril to 5 mg  · Monitor           VTE Pharmacologic Prophylaxis:   Pharmacologic: Enoxaparin (Lovenox)  Mechanical VTE Prophylaxis in Place: Yes    Patient Centered Rounds: I have performed bedside rounds with nursing staff today  Discussions with Specialists or Other Care Team Provider:     Education and Discussions with Family / Patient:  Discussed with patient's daughter    Time Spent for Care: 30 minutes  More than 50% of total time spent on counseling and coordination of care as described above  Current Length of Stay: 7 day(s)    Current Patient Status: Inpatient   Certification Statement: The patient will continue to require additional inpatient hospital stay due to Above    Discharge Plan / Estimated Discharge Date:  Daughter requesting more skilled rehab when stable    Code Status: Level 3 - DNAR and DNI      Subjective:   Patient seen and examined  Comfortable in bed  One episode of watery diarrhea  Denied abdominal pain  No nausea vomiting    Objective:     Vitals:   Temp (24hrs), Av 8 °F (37 1 °C), Min:98 8 °F (37 1 °C), Max:98 8 °F (37 1 °C)    Temp:  [98 8 °F (37 1 °C)] 98 8 °F (37 1 °C)  HR:  [58-63] 63  Resp:  [16] 16  BP: (141-182)/(47-59) 182/59  SpO2:  [97 %-98 %] 98 %  Body mass index is 28 38 kg/m²  Input and Output Summary (last 24 hours):        Intake/Output Summary (Last 24 hours) at 2020 1238  Last data filed at 2020 1900  Gross per 24 hour   Intake 50 ml   Output 89 ml   Net -39 ml       Physical Exam:     Physical Exam  Patient is awake alert in no acute distress  Comfortable in bed  Lung clear to auscultation bilateral anterior  Heart positive S1-S2 and murmur  Abdomen soft nontender positive bowel sounds  Lower extremities no edema      Additional Data:     Labs:    Results from last 7 days   Lab Units 20  0541   WBC Thousand/uL 4 79   HEMOGLOBIN g/dL 9 3*   HEMATOCRIT % 29 6*   PLATELETS Thousands/uL 135*   NEUTROS PCT % 70   LYMPHS PCT % 18   MONOS PCT % 7   EOS PCT % 5     Results from last 7 days   Lab Units 01/04/20  0541  12/29/19 1919 12/29/19 1908   POTASSIUM mmol/L 3 9   < > 3 9  --    CHLORIDE mmol/L 110*   < > 105  --    CO2 mmol/L 26   < > 26  --    CO2, I-STAT mmol/L  --   --   --  26   BUN mg/dL 14   < > 28*  --    CREATININE mg/dL 0 78   < > 1 13  --    CALCIUM mg/dL 9 0   < > 9 7  --    ALK PHOS U/L  --   --  72  --    ALT U/L  --   --  20  --    AST U/L  --   --  30  --    GLUCOSE, ISTAT mg/dl  --   --   --  140    < > = values in this interval not displayed  Results from last 7 days   Lab Units 12/29/19 1919   INR  1 04       * I Have Reviewed All Lab Data Listed Above  * Additional Pertinent Lab Tests Reviewed: Michael 66 Admission Reviewed    Imaging:    Imaging Reports Reviewed Today Include:   Imaging Personally Reviewed by Myself Includes:     Recent Cultures (last 7 days):     Results from last 7 days   Lab Units 12/29/19 2125 12/29/19 1924 12/29/19 1919   BLOOD CULTURE   --  No Growth After 5 Days   Micrococcus luteus*   GRAM STAIN RESULT   --   --  Gram positive cocci in clusters*   URINE CULTURE  >100,000 cfu/ml Escherichia coli*  --   --        Last 24 Hours Medication List:     Current Facility-Administered Medications:  acetaminophen 650 mg Oral Q6H PRN Houston Ko MD   aspirin 81 mg Oral Daily Houston Ko MD   atenolol 50 mg Oral Daily Rufino Harrell PA-C   cholecalciferol 1,000 Units Oral Daily Houston Ko MD   enoxaparin 30 mg Subcutaneous Daily Houston Ko MD   hydrALAZINE 5 mg Intravenous Q6H PRN Rashad Schiller, DO   hydrochlorothiazide 25 mg Oral Daily Rashad Schiller, DO   lactase 9,000 Units Oral TID With Meals Bela English PA-C   lisinopril 5 mg Oral Daily Rashad Schiller, DO   loperamide 2 mg Oral 4x Daily PRN Houston Ko MD   loperamide 2 mg Oral Daily Rashad Tolbert DO   multivitamin stress formula 1 tablet Oral Daily Jeanette Nieves MD   ondansetron 4 mg Intravenous Q6H PRN Jeanette Nieves MD   potassium chloride 10 mEq Oral Daily Jeanette Nieves MD   pravastatin 20 mg Oral Daily With Murphy Stinson MD   saccharomyces boulardii 250 mg Oral BID Andre English PA-C   sodium chloride (PF) 3 mL Intravenous PRN Mayda Roman MD   timolol 1 drop Both Eyes BID Jeanette Nieves MD        Today, Patient Was Seen By: Carmen Han DO    ** Please Note: This note has been constructed using a voice recognition system   **

## 2020-01-05 NOTE — PROGRESS NOTES
Progress Note - Infectious Disease   Erinn Campbell 80 y o  female MRN: [id number]  Unit/Bed#: [facility] 715-01 Encounter: [id number]      Impression:  1  Probable E coli UTI  2  Micrococcus luteus bacteremia versus contaminant  3  Seborrheic dermatitis of scalp     Recommendations:  Bacteremia versus contaminant  Patient is afebrile  Blood culture 1 of 2 shows Micrococcus luteus  She is not confused this evening  Had two loose stools earlier which is improved from yesterday  1  Discussed with primary service and nursing  2  As discussed agree with discontinuing cefazolin    E coli UTI  1  Cefazolin has been discontinued    Seborrheic dermatitis of scalp  1  Will see if we can obtain selenium sulfide shampoo    Antibiotics:  1  Cefazolin 1 g q 12 hours IV day 6 of 7 total antibiotic Rx    Subjective:  She has no specific complaints but says I just do not feel well    Denies fevers, chills, or sweats  Denies nausea, vomiting,      Objective:  Vitals:  HR:  [55-63] 55  Resp:  [16] 16  BP: (140-182)/(55-59) 140/55  SpO2:  [96 %-98 %] 96 %  No data recorded  Current: Temperature: 98 8 °F (37 1 °C)    Physical Exam:     General Appearance:  Alert elderly chronically ill-appearing,  nontoxic, no acute distress  Has seborrheic changes of  scalp and face   Throat: Oropharynx moist without lesions  Lips, mucosa, and tongue normal, many missing teeth   Neck: Supple, symmetrical, trachea midline, no adenopathy,  no tenderness/mass/nodules   Lungs:   Clear to auscultation bilaterally, no audible wheezes, rhonchi or rales; respirations unlabored   Heart:  Regular rate and rhythm, S1, S2 normal, grade 1/6 systolic murmur and grade 2/6 diastolic murmur, no rub or gallop   Abdomen:   Soft, non-tender, non-distended, positive bowel sounds    No masses, no organomegaly    No CVA tenderness   Extremities: Extremities normal, atraumatic, no clubbing, cyanosis or edema   Skin: Scalp and face with seborrheic dermatitis changes Invasive Devices     Peripheral Intravenous Line            Peripheral IV 01/02/20 Dorsal (posterior); Left Hand 3 days                Labs, Imaging, & Other studies:   All pertinent labs were personally reviewed  Results from last 7 days   Lab Units 01/04/20  0541 01/01/20  0624 12/31/19  0458   WBC Thousand/uL 4 79 4 72 4 50   HEMOGLOBIN g/dL 9 3* 9 6* 9 1*   PLATELETS Thousands/uL 135* 126* 111*     Results from last 7 days   Lab Units 01/04/20  0541 01/02/20  0604 12/31/19 0458  12/29/19 1919 12/29/19  1908   SODIUM mmol/L 140 141 142   < > 137  --    POTASSIUM mmol/L 3 9 4 0 3 4*   < > 3 9  --    CHLORIDE mmol/L 110* 113* 115*   < > 105  --    CO2 mmol/L 26 23 20*   < > 26  --    CO2, I-STAT mmol/L  --   --   --   --   --  26   BUN mg/dL 14 15 14   < > 28*  --    CREATININE mg/dL 0 78 0 84 0 67   < > 1 13  --    EGFR ml/min/1 73sq m 65 60 75   < > 42  --    GLUCOSE, ISTAT mg/dl  --   --   --   --   --  140   CALCIUM mg/dL 9 0 9 0 8 7   < > 9 7  --    AST U/L  --   --   --   --  30  --    ALT U/L  --   --   --   --  20  --    ALK PHOS U/L  --   --   --   --  72  --     < > = values in this interval not displayed  Results from last 7 days   Lab Units 12/29/19 2125 12/29/19 1924 12/29/19 1919   BLOOD CULTURE   --  No Growth After 5 Days   Micrococcus luteus*   GRAM STAIN RESULT   --   --  Gram positive cocci in clusters*   URINE CULTURE  >100,000 cfu/ml Escherichia coli*  --   --

## 2020-01-05 NOTE — ASSESSMENT & PLAN NOTE
· Blood pressure on admission 194/100  · Still not well controlled  · Continue atenolol and  HCTZ, increase lisinopril to 5 mg  · Monitor

## 2020-01-05 NOTE — ASSESSMENT & PLAN NOTE
No abdominal pain, no leukocytosis  Hold cefazolin  Continue to monitor  Check C diff toxin if >3 diarrhea episodes

## 2020-01-05 NOTE — ASSESSMENT & PLAN NOTE
· Secondary to E coli  · ID input appreciated  · History of C diff colitis  · Currently on IV cefazolin  · Discontinue due to diarrhea

## 2020-01-06 LAB
ANION GAP SERPL CALCULATED.3IONS-SCNC: 6 MMOL/L (ref 4–13)
BASOPHILS # BLD AUTO: 0.03 THOUSANDS/ΜL (ref 0–0.1)
BASOPHILS NFR BLD AUTO: 1 % (ref 0–1)
BUN SERPL-MCNC: 21 MG/DL (ref 5–25)
CALCIUM SERPL-MCNC: 9.5 MG/DL (ref 8.3–10.1)
CHLORIDE SERPL-SCNC: 111 MMOL/L (ref 100–108)
CO2 SERPL-SCNC: 22 MMOL/L (ref 21–32)
CREAT SERPL-MCNC: 0.82 MG/DL (ref 0.6–1.3)
EOSINOPHIL # BLD AUTO: 0.34 THOUSAND/ΜL (ref 0–0.61)
EOSINOPHIL NFR BLD AUTO: 7 % (ref 0–6)
ERYTHROCYTE [DISTWIDTH] IN BLOOD BY AUTOMATED COUNT: 17.1 % (ref 11.6–15.1)
GFR SERPL CREATININE-BSD FRML MDRD: 61 ML/MIN/1.73SQ M
GLUCOSE SERPL-MCNC: 73 MG/DL (ref 65–140)
HCT VFR BLD AUTO: 31.1 % (ref 34.8–46.1)
HGB BLD-MCNC: 9.6 G/DL (ref 11.5–15.4)
IMM GRANULOCYTES # BLD AUTO: 0.02 THOUSAND/UL (ref 0–0.2)
IMM GRANULOCYTES NFR BLD AUTO: 0 % (ref 0–2)
LYMPHOCYTES # BLD AUTO: 0.9 THOUSANDS/ΜL (ref 0.6–4.47)
LYMPHOCYTES NFR BLD AUTO: 19 % (ref 14–44)
MCH RBC QN AUTO: 28.3 PG (ref 26.8–34.3)
MCHC RBC AUTO-ENTMCNC: 30.9 G/DL (ref 31.4–37.4)
MCV RBC AUTO: 92 FL (ref 82–98)
MONOCYTES # BLD AUTO: 0.32 THOUSAND/ΜL (ref 0.17–1.22)
MONOCYTES NFR BLD AUTO: 7 % (ref 4–12)
NEUTROPHILS # BLD AUTO: 3.25 THOUSANDS/ΜL (ref 1.85–7.62)
NEUTS SEG NFR BLD AUTO: 66 % (ref 43–75)
NRBC BLD AUTO-RTO: 0 /100 WBCS
PLATELET # BLD AUTO: 155 THOUSANDS/UL (ref 149–390)
PMV BLD AUTO: 10.7 FL (ref 8.9–12.7)
POTASSIUM SERPL-SCNC: 4.6 MMOL/L (ref 3.5–5.3)
RBC # BLD AUTO: 3.39 MILLION/UL (ref 3.81–5.12)
SODIUM SERPL-SCNC: 139 MMOL/L (ref 136–145)
WBC # BLD AUTO: 4.86 THOUSAND/UL (ref 4.31–10.16)

## 2020-01-06 PROCEDURE — 92526 ORAL FUNCTION THERAPY: CPT

## 2020-01-06 PROCEDURE — 85025 COMPLETE CBC W/AUTO DIFF WBC: CPT | Performed by: INTERNAL MEDICINE

## 2020-01-06 PROCEDURE — 99232 SBSQ HOSP IP/OBS MODERATE 35: CPT | Performed by: INTERNAL MEDICINE

## 2020-01-06 PROCEDURE — 97110 THERAPEUTIC EXERCISES: CPT

## 2020-01-06 PROCEDURE — 80048 BASIC METABOLIC PNL TOTAL CA: CPT | Performed by: INTERNAL MEDICINE

## 2020-01-06 PROCEDURE — 99231 SBSQ HOSP IP/OBS SF/LOW 25: CPT | Performed by: INTERNAL MEDICINE

## 2020-01-06 RX ADMIN — TIMOLOL MALEATE 1 DROP: 5 SOLUTION/ DROPS OPHTHALMIC at 16:45

## 2020-01-06 RX ADMIN — ASPIRIN 81 MG 81 MG: 81 TABLET ORAL at 08:39

## 2020-01-06 RX ADMIN — ATENOLOL 50 MG: 50 TABLET ORAL at 08:39

## 2020-01-06 RX ADMIN — POTASSIUM CHLORIDE 10 MEQ: 750 TABLET, EXTENDED RELEASE ORAL at 08:39

## 2020-01-06 RX ADMIN — LACTASE TAB 3000 UNIT 9000 UNITS: 3000 TAB at 11:44

## 2020-01-06 RX ADMIN — ZINC 1 TABLET: TAB ORAL at 08:39

## 2020-01-06 RX ADMIN — HYDROCHLOROTHIAZIDE 25 MG: 25 TABLET ORAL at 08:39

## 2020-01-06 RX ADMIN — Medication 250 MG: at 16:45

## 2020-01-06 RX ADMIN — LACTASE TAB 3000 UNIT 9000 UNITS: 3000 TAB at 08:39

## 2020-01-06 RX ADMIN — MELATONIN 1000 UNITS: at 08:39

## 2020-01-06 RX ADMIN — TIMOLOL MALEATE 1 DROP: 5 SOLUTION/ DROPS OPHTHALMIC at 08:39

## 2020-01-06 RX ADMIN — PRAVASTATIN SODIUM 20 MG: 20 TABLET ORAL at 16:45

## 2020-01-06 RX ADMIN — Medication 250 MG: at 08:39

## 2020-01-06 RX ADMIN — LISINOPRIL 5 MG: 5 TABLET ORAL at 08:39

## 2020-01-06 RX ADMIN — LOPERAMIDE HYDROCHLORIDE 2 MG: 2 CAPSULE ORAL at 08:39

## 2020-01-06 RX ADMIN — LACTASE TAB 3000 UNIT 9000 UNITS: 3000 TAB at 16:45

## 2020-01-06 RX ADMIN — ENOXAPARIN SODIUM 30 MG: 30 INJECTION SUBCUTANEOUS at 08:39

## 2020-01-06 NOTE — PLAN OF CARE
Problem: Potential for Falls  Goal: Patient will remain free of falls  Description  INTERVENTIONS:  - Assess patient frequently for physical needs  -  Identify cognitive and physical deficits and behaviors that affect risk of falls  -  [place] fall precautions as indicated by assessment   - Educate patient/family on patient safety including physical limitations  - Instruct patient to call for assistance with activity based on assessment  - Modify environment to reduce risk of injury  - Consider OT/PT consult to assist with strengthening/mobility  Outcome: Progressing     Problem: Prexisting or High Potential for Compromised Skin Integrity  Goal: Skin integrity is maintained or improved  Description  INTERVENTIONS:  - Identify patients at risk for skin breakdown  - Assess and monitor skin integrity  - Assess and monitor nutrition and hydration status  - Monitor labs   - Assess for incontinence   - Turn and reposition patient  - Assist with mobility/ambulation  - Relieve pressure over bony prominences  - Avoid friction and shearing  - Provide appropriate hygiene as needed including keeping skin clean and dry  - Evaluate need for skin moisturizer/barrier cream  - Collaborate with interdisciplinary team   - Patient/family teaching  - Consider wound care consult   Outcome: Progressing     Problem: Nutrition/Hydration-ADULT  Goal: Nutrient/Hydration intake appropriate for improving, restoring or maintaining nutritional needs  Description  Monitor and assess patient's nutrition/hydration status for malnutrition  Collaborate with interdisciplinary team and initiate plan and interventions as ordered  Monitor patient's weight and dietary intake as ordered or per policy  Utilize nutrition screening tool and intervene as necessary  Determine patient's food preferences and provide high-protein, high-caloric foods as appropriate       INTERVENTIONS:  - Monitor oral intake, urinary output, labs, and treatment plans  - Assess nutrition and hydration status and recommend course of action  - Evaluate amount of meals eaten  - Assist patient with eating if necessary   - Allow adequate time for meals  - Recommend/ encourage appropriate diets, oral nutritional supplements, and vitamin/mineral supplements  - Assess need for intravenous fluids  - Provide specific nutrition/hydration education as appropriate  - Include patient/family/caregiver in decisions related to nutrition   Outcome: Progressing     Problem: PAIN - ADULT  Goal: Verbalizes/displays adequate comfort level or baseline comfort level  Description  Interventions:  - Encourage patient to monitor pain and request assistance  - Assess pain using appropriate pain scale  - Administer analgesics based on type and severity of pain and evaluate response  - Implement non-pharmacological measures as appropriate and evaluate response  - Notify physician/advanced practitioner if interventions unsuccessful or patient reports new pain   Outcome: Progressing     Problem: INFECTION - ADULT  Goal: Absence or prevention of progression during hospitalization  Description  INTERVENTIONS:  - Assess and monitor for signs and symptoms of infection  - Monitor lab/diagnostic results  - Monitor all insertion sites, i e  indwelling lines, tubes, and drains  - Administer medications as ordered  - Instruct and encourage patient and family to use good hand hygiene technique  - Identify and instruct in appropriate isolation precautions for identified infection/condition   Outcome: Progressing     Problem: SAFETY ADULT  Goal: Patient will remain free of falls  Description  INTERVENTIONS:  - Assess patient frequently for physical needs  -  Identify cognitive and physical deficits and behaviors that affect risk of falls    -  [place] fall precautions as indicated by assessment   - Educate patient/family on patient safety including physical limitations  - Instruct patient to call for assistance with activity based on assessment  - Modify environment to reduce risk of injury  - Consider OT/PT consult to assist with strengthening/mobility  Outcome: Progressing  Goal: Maintain or return mobility status to optimal level  Description  INTERVENTIONS:  - Assess patient's baseline mobility status (ambulation, transfers, stairs, etc )    - Identify cognitive and physical deficits and behaviors that affect mobility  - Identify mobility aids required to assist with transfers and/or ambulation (gait belt, sit-to-stand, lift, walker, cane, etc )  - [place] fall precautions as indicated by assessment  - Record patient progress and toleration of activity level on Mobility SBAR; progress patient to next Phase/Stage  - Instruct patient to call for assistance with activity based on assessment  - Consider rehabilitation consult to assist with strengthening/weightbearing, etc   Outcome: Progressing     Problem: DISCHARGE PLANNING  Goal: Discharge to home or other facility with appropriate resources  Description  INTERVENTIONS:  - Identify barriers to discharge w/patient and caregiver  - Arrange for needed discharge resources and transportation as appropriate  - Identify discharge learning needs (meds, wound care, etc )  - Refer to Case Management Department for coordinating discharge planning if the patient needs post-hospital services based on physician/advanced practitioner order or complex needs related to functional status, cognitive ability, or social support system   Outcome: Progressing     Problem: Knowledge Deficit  Goal: Patient/family/caregiver demonstrates understanding of disease process, treatment plan, medications, and discharge instructions  Description  Complete learning assessment and assess knowledge base    Interventions:  - Provide teaching at level of understanding  - Provide teaching via preferred learning methods  Outcome: Progressing     Problem: NEUROSENSORY - ADULT  Goal: Achieves stable or improved neurological status  Description  INTERVENTIONS  - Monitor and report changes in neurological status  - Monitor vital signs such as temperature, blood pressure, glucose, and any other labs ordered   - Monitor for seizure activity and implement precautions if appropriate       Outcome: Progressing  Goal: Achieves maximal functionality and self care  Description  INTERVENTIONS  - Monitor swallowing and airway patency with patient fatigue and changes in neurological status  - Encourage and assist patient to increase activity and self care     - Encourage visually impaired, hearing impaired and aphasic patients to use assistive/communication devices  Outcome: Progressing     Problem: GENITOURINARY - ADULT  Goal: Maintains or returns to baseline urinary function  Description  INTERVENTIONS:  - Assess urinary function  - Encourage oral fluids to ensure adequate hydration if ordered  - Administer IV fluids as ordered to ensure adequate hydration  - Administer ordered medications as needed  - Offer frequent toileting  - Follow urinary retention protocol if ordered  Outcome: Progressing     Problem: METABOLIC, FLUID AND ELECTROLYTES - ADULT  Goal: Electrolytes maintained within normal limits  Description  INTERVENTIONS:  - Monitor labs and assess patient for signs and symptoms of electrolyte imbalances  - Administer electrolyte replacement as ordered  - Monitor response to electrolyte replacements, including repeat lab results as appropriate  - Instruct patient on fluid and nutrition as appropriate  Outcome: Progressing  Goal: Fluid balance maintained  Description  INTERVENTIONS:  - Monitor labs   - Monitor I/O and WT  - Instruct patient on fluid and nutrition as appropriate  - Assess for signs & symptoms of volume excess or deficit  Outcome: Progressing     Problem: SKIN/TISSUE INTEGRITY - ADULT  Goal: Skin integrity remains intact  Description  INTERVENTIONS  - Identify patients at risk for skin breakdown  - Assess and monitor skin integrity  - Assess and monitor nutrition and hydration status  - Monitor labs (i e  albumin)  - Assess for incontinence   - Turn and reposition patient  - Assist with mobility/ambulation  - Relieve pressure over bony prominences  - Avoid friction and shearing  - Provide appropriate hygiene as needed including keeping skin clean and dry  - Evaluate need for skin moisturizer/barrier cream  - Collaborate with interdisciplinary team (i e  Nutrition, Rehabilitation, etc )   - Patient/family teaching  Outcome: Progressing

## 2020-01-06 NOTE — PROGRESS NOTES
Progress Note - Infectious Disease   Roseline Rivera 80 y o  female MRN: [id number]  Unit/Bed#: [facility] 715-01 Encounter: [id number]      Impression:  1  Probable E coli UTI  2  Micrococcus luteus bacteremia versus contaminant  3  Seborrheic dermatitis of scalp     Recommendations:  Bacteremia versus contaminant  Patient is afebrile  Blood culture 1 of 2 shows Micrococcus luteus  She is not confused this evening  Had 1 loose stools earlier which is improved from yesterday  1  Observe off antibiotics    E coli UTI  1  Cefazolin has been discontinued    Seborrheic dermatitis of scalp  1  Will see if we can obtain selenium sulfide shampoo    Antibiotics:  1  None    Subjective: Has mild confusion and had 1 loose BM today  Denies fevers, chills, or sweats  Denies nausea, vomiting,      Objective:  Vitals:  HR:  [53-55] 53  Resp:  [20] 20  BP: (150-163)/(48-52) 160/52  SpO2:  [94 %-95 %] 95 %  No data recorded  Current: Temperature: 98 8 °F (37 1 °C)    Physical Exam:     General Appearance:  Alert elderly chronically ill-appearing,  nontoxic, no acute distress  Has seborrheic changes of  scalp and face   Throat: Oropharynx moist without lesions  Lips, mucosa, and tongue normal, many missing teeth   Neck: Supple, symmetrical, trachea midline, no adenopathy,  no tenderness/mass/nodules   Lungs:   Clear to auscultation bilaterally, no audible wheezes, rhonchi or rales; respirations unlabored   Heart:  Regular rate and rhythm, S1, S2 normal, grade 1/6 systolic murmur and grade 2/6 diastolic murmur, no rub or gallop   Abdomen:   Soft, non-tender, non-distended, positive bowel sounds    No masses, no organomegaly    No CVA tenderness   Extremities: Extremities normal, atraumatic, no clubbing, cyanosis or edema   Skin: Scalp and face with seborrheic dermatitis changes         Invasive Devices     None                 Labs, Imaging, & Other studies:   All pertinent labs were personally reviewed  Results from last 7 days Lab Units 01/06/20  0649 01/04/20  0541 01/01/20  0624   WBC Thousand/uL 4 86 4 79 4 72   HEMOGLOBIN g/dL 9 6* 9 3* 9 6*   PLATELETS Thousands/uL 155 135* 126*     Results from last 7 days   Lab Units 01/06/20  0507 01/04/20  0541 01/02/20  0604   SODIUM mmol/L 139 140 141   POTASSIUM mmol/L 4 6 3 9 4 0   CHLORIDE mmol/L 111* 110* 113*   CO2 mmol/L 22 26 23   BUN mg/dL 21 14 15   CREATININE mg/dL 0 82 0 78 0 84   EGFR ml/min/1 73sq m 61 65 60   CALCIUM mg/dL 9 5 9 0 9 0

## 2020-01-06 NOTE — SPEECH THERAPY NOTE
Speech Language/Pathology    Speech/Language Pathology Progress Note    Patient Name: Abigail Oconnell  SQUUJ'Y Date: 1/6/2020     Problem List  Principal Problem:    Sepsis ([facility] [address] )  Active Problems:    Elevated lactic acid level    Hypertensive urgency    Protein-calorie malnutrition, moderate ([facility] [address] )    Fall    Urinary tract infection    Lactose intolerance    Dysphagia    Diarrhea       Past Medical History  History reviewed  No pertinent past medical history  Past Surgical History  History reviewed  No pertinent surgical history  Subjective:  "Can you help me eat my lunch?"    Objective: The patient is awake and alert today  She is OOB in chair and seen at lunch meal  The patient is seen with a regular diet, including sandwich, mixed consistency soup, apple crisp and thin liquids  SLP assists with feeding herself  Bite strength for sandwich is adequate  Mastication time is minimally prolonged with regular solids, but efficient with no oral residue  The patient tolerates soup well with no signs of aspiration  She takes small sips of thin liquids via straw with no overt s/s aspiration  Patient has approx 25% intake, but is tolerating diet well  Assessment:  Patient is tolerating regular diet and thin liquids well  Plan/Recommendations:  Continue regular diet  No further ST warranted  Please re-consult with concerns

## 2020-01-06 NOTE — PHYSICAL THERAPY NOTE
Physical Therapy Progress Note     01/06/20 1202   Pain Assessment   Pain Assessment No/denies pain   Pain Score No Pain   Restrictions/Precautions   Weight Bearing Precautions Per Order No   Other Precautions Cognitive; Chair Alarm; Fall Risk   General   Family/Caregiver Present No   Cognition   Overall Cognitive Status Impaired   Arousal/Participation Uncooperative   Exercises   Hip Flexion Sitting;15 reps;AROM; Bilateral   Knee AROM Long Arc Quad Sitting;15 reps;AROM; Bilateral   Ankle Pumps Sitting;15 reps;AROM; Bilateral   Assessment   Prognosis Fair   Problem List Decreased strength;Decreased endurance; Impaired balance;Decreased mobility; Decreased coordination;Decreased cognition; Impaired judgement;Decreased safety awareness   Assessment Pt refused attempts with transfers and ambulation today  Encouraged however declined  Pt was only agreeable to complete seated BLE exercises  Pt would benefit from continued physical therapy to maximize functional independence  Goals   Patient Goals None stated due to cognitive deficits   STG Expiration Date 01/13/20   Short Term Goal #1 1  Pt will demonstrate ability to perform all aspects of bed mobility with Min A in order to increase independence and decrease burden on caregivers  2  Pt will demonstrate ability to perform functional transfers with Mind A in order to increase independence and decrease burden on caregivers  3  Pt will demonstrate ability to ambulate 25 ft with least restrictive AD with Min A in order to return to mobility safely  4  Pt will demonstrate improved balance by one grade order to decrease risk of falls  5  Pt will increase b/l LE strength by 1 grade in order to increase ease of functional mobility and transfers  PT Treatment Day 3   Plan   Treatment/Interventions Functional transfer training;LE strengthening/ROM; Therapeutic exercise; Endurance training;Cognitive reorientation;Patient/family training;Bed mobility;Gait training;Spoke to nursing   Progress Slow progress, cognitive deficits   PT Frequency   (3-5x/week)   Recommendation   Recommendation Post acute IP rehab   Equipment Recommended Walker; Wheelchair  (RW)     Brady Espinoza, PTA

## 2020-01-06 NOTE — ASSESSMENT & PLAN NOTE
No abdominal pain, no leukocytosis  Resolved  Continue to monitor  Check C diff toxin if >3 diarrhea episodes

## 2020-01-06 NOTE — ASSESSMENT & PLAN NOTE
· Secondary to E coli  · ID input appreciated  · History of C diff colitis  · Completed 7 day course of antibiotic

## 2020-01-06 NOTE — RESTORATIVE TECHNICIAN NOTE
Restorative Specialist Mobility Note       Activity: Ambulate in room, Chair, Dangle, Stand at bedside(Educated/encouraged pt to ambulate with assistance 3-4 x's/day  Chair alarm on   Pt callbell, phone/tray within reach )     Assistive Device: Other (Comment)(HHA x2 OOB to the chair )         Madiha ZAMBRANO, Restorative Technician, [employer]

## 2020-01-06 NOTE — PROGRESS NOTES
Progress Note - Ahsan Ray 9/20/1925, 80 y o  female MRN: [id number]    [facility] Encounter: [id number]    Primary Care Provider: No primary care provider on file  Date and time admitted to hospital: 12/29/2019  6:52 PM        * Sepsis [facility])  Assessment & Plan  · Patient presented from nursing home s/p mechanical fall, trauma cleared, imaging negative  · Incidentally found to have a positive sepsis criteria of temperature maximum 101 respiratory 28 lactic acid 2 4 on admission  · Urine culture positive for E coli  · Blood culture 1/2 set  positive for Micrococcus Luteus rule out contamination  · influenza and RSV PCR negative  · Afebrile and no leukocytosis  · Evaluated by ID and treated with IV cefazolin  · Cardiac echo with EF 35%, grade 2 diastolic dysfunction and multiple valvular disease  · PT/OT input appreciated--recommending rehab  · Awaiting rehab placement        Urinary tract infection  Assessment & Plan  · Secondary to E coli  · ID input appreciated  · History of C diff colitis  · Completed 7 day course of antibiotic      Diarrhea  Assessment & Plan  No abdominal pain, no leukocytosis  Resolved  Continue to monitor  Check C diff toxin if >3 diarrhea episodes      Lactose intolerance  Assessment & Plan  · Lactose restricted diet   · Continue Lactaid pills t i d  Hypertensive urgency  Assessment & Plan  · Blood pressure on admission 194/100  · Continue atenolol and  HCTZ, lisinopril was added  · Monitor        VTE Pharmacologic Prophylaxis:   Pharmacologic: Enoxaparin (Lovenox)  Mechanical VTE Prophylaxis in Place: Yes    Patient Centered Rounds: I have performed bedside rounds with nursing staff today  Discussions with Specialists or Other Care Team Provider:     Education and Discussions with Family / Patient:  Patient    Time Spent for Care: 30 minutes  More than 50% of total time spent on counseling and coordination of care as described above      Current Length of Stay: 8 day(s)    Current Patient Status: Inpatient   Certification Statement: The patient will continue to require additional inpatient hospital stay due to Awaiting rehab placement    Discharge Plan / Estimated Discharge Date:  Awaiting rehab placement    Code Status: Level 3 - DNAR and DNI      Subjective:   Patient is comfortable in bed  No nausea vomiting  Diarrhea has improved  Denied pain    Objective:     Vitals:   No data recorded  HR:  [53-55] 53  Resp:  [20] 20  BP: (150-163)/(48-50) 163/48  SpO2:  [94 %-95 %] 95 %  Body mass index is 28 38 kg/m²  Input and Output Summary (last 24 hours): Intake/Output Summary (Last 24 hours) at 1/6/2020 1529  Last data filed at 1/6/2020 0800  Gross per 24 hour   Intake 180 ml   Output    Net 180 ml       Physical Exam:     Physical Exam  Patient is awake alert in no acute distress  Lung clear to auscultation bilateral anteriorly  Heart positive K8-G4 systolic murmur  Abdomen soft nontender positive bowel sounds  Lower extremities no edema    Additional Data:     Labs:    Results from last 7 days   Lab Units 01/06/20  0649   WBC Thousand/uL 4 86   HEMOGLOBIN g/dL 9 6*   HEMATOCRIT % 31 1*   PLATELETS Thousands/uL 155   NEUTROS PCT % 66   LYMPHS PCT % 19   MONOS PCT % 7   EOS PCT % 7*     Results from last 7 days   Lab Units 01/06/20  0507   POTASSIUM mmol/L 4 6   CHLORIDE mmol/L 111*   CO2 mmol/L 22   BUN mg/dL 21   CREATININE mg/dL 0 82   CALCIUM mg/dL 9 5           * I Have Reviewed All Lab Data Listed Above  * Additional Pertinent Lab Tests Reviewed:  Michael 66 Admission Reviewed    Imaging:    Imaging Reports Reviewed Today Include:   Imaging Personally Reviewed by Myself Includes:      Recent Cultures (last 7 days):           Last 24 Hours Medication List:     Current Facility-Administered Medications:  acetaminophen 650 mg Oral Q6H PRN Suma Morfin MD   aspirin 81 mg Oral Daily Suma Morfin MD   atenolol 50 mg Oral Daily [facility] CARMELINA English   cholecalciferol 1,000 Units Oral Daily Divine Wallace MD   enoxaparin 30 mg Subcutaneous Daily Divine Wallace MD   hydrALAZINE 5 mg Intravenous Q6H PRN Sunshine Tyler,    hydrochlorothiazide 25 mg Oral Daily Sunshine Tyler, DO   lactase 9,000 Units Oral TID With Meals [facility] CARMELINA English   lisinopril 5 mg Oral Daily Sunshine Tyler,    loperamide 2 mg Oral 4x Daily PRN Divine Wallace MD   loperamide 2 mg Oral Daily Sunshine Tyler, DO   multivitamin stress formula 1 tablet Oral Daily Divine Wallace MD   ondansetron 4 mg Intravenous Q6H PRN Divine Wallace MD   potassium chloride 10 mEq Oral Daily Divine Wallace MD   pravastatin 20 mg Oral Daily With Prashanth Barbosa MD   saccharomyces boulardii 250 mg Oral BID [facility] CARMELINA English   sodium chloride (PF) 3 mL Intravenous PRN Kenya Viramontes MD   timolol 1 drop Both Eyes BID Divine Wallace MD        Today, Patient Was Seen By: Sunshine Tyler DO    ** Please Note: This note has been constructed using a voice recognition system   **

## 2020-01-06 NOTE — SOCIAL WORK
Contacted pt's dg Swetha , contact # [phone], to discuss pt's discharge plan  Swetha Medellin stated that pt lives at [facility] and she would prefer pt to go to inpt rehab prior to retuning to [facility]  She feels that pt would benefit from inpt rehab  Offered to send a list of inpt rehab facilities to her via email  She was agreeable to same and informed CM of her email address - [email or web address]    A post acute care recommendation was made by your care team for STR  Discussed Freedom of Choice with patient  List of agencies given to patient via in person  patient aware the list is custom filtered for them by zip code location and that [facility] post acute providers are designated  Inpt rehab list sent to Swetha Medellin via email as requested

## 2020-01-06 NOTE — ASSESSMENT & PLAN NOTE
· Patient presented from nursing home s/p mechanical fall, trauma cleared, imaging negative  · Incidentally found to have a positive sepsis criteria of temperature maximum 101 respiratory 28 lactic acid 2 4 on admission      · Urine culture positive for E coli  · Blood culture 1/2 set  positive for Micrococcus Luteus rule out contamination  · influenza and RSV PCR negative  · Afebrile and no leukocytosis  · Evaluated by ID and treated with IV cefazolin  · Cardiac echo with EF 14%, grade 2 diastolic dysfunction and multiple valvular disease  · PT/OT input appreciated--recommending rehab  · Awaiting rehab placement

## 2020-01-06 NOTE — SOCIAL WORK
Received phone call from pt's dgt Lance Garcia who stated that she reviewed the inpt rehab list and discussed options with her sister  They prefer a referral to 1  [facility], 3  [facility], and 4  [facility]   [facility] referral sent for same

## 2020-01-06 NOTE — PLAN OF CARE
Problem: PHYSICAL THERAPY ADULT  Goal: Performs mobility at highest level of function for planned discharge setting  See evaluation for individualized goals  Description  Treatment/Interventions: Functional transfer training, LE strengthening/ROM, Endurance training, Therapeutic exercise, Patient/family training, Equipment eval/education, Bed mobility, Gait training, Spoke to nursing  Equipment Recommended: Sanjay Scripture, Wheelchair       See flowsheet documentation for full assessment, interventions and recommendations  Outcome: Progressing  Note:   Prognosis: Fair  Problem List: Decreased strength, Decreased endurance, Impaired balance, Decreased mobility, Decreased coordination, Decreased cognition, Impaired judgement, Decreased safety awareness  Assessment: Pt refused attempts with transfers and ambulation today  Encouraged however declined  Pt was only agreeable to complete seated BLE exercises  Pt would benefit from continued physical therapy to maximize functional independence  Recommendation: Post acute IP rehab     PT - OK to Discharge: Yes(once medically cleared)    See flowsheet documentation for full assessment

## 2020-01-06 NOTE — PLAN OF CARE
Problem: Potential for Falls  Goal: Patient will remain free of falls  Description  INTERVENTIONS:  - Assess patient frequently for physical needs  -  Identify cognitive and physical deficits and behaviors that affect risk of falls  -  [place] fall precautions as indicated by assessment   - Educate patient/family on patient safety including physical limitations  - Instruct patient to call for assistance with activity based on assessment  - Modify environment to reduce risk of injury  - Consider OT/PT consult to assist with strengthening/mobility  Outcome: Progressing     Problem: Prexisting or High Potential for Compromised Skin Integrity  Goal: Skin integrity is maintained or improved  Description  INTERVENTIONS:  - Identify patients at risk for skin breakdown  - Assess and monitor skin integrity  - Assess and monitor nutrition and hydration status  - Monitor labs   - Assess for incontinence   - Turn and reposition patient  - Assist with mobility/ambulation  - Relieve pressure over bony prominences  - Avoid friction and shearing  - Provide appropriate hygiene as needed including keeping skin clean and dry  - Evaluate need for skin moisturizer/barrier cream  - Collaborate with interdisciplinary team   - Patient/family teaching  - Consider wound care consult   Outcome: Progressing     Problem: Nutrition/Hydration-ADULT  Goal: Nutrient/Hydration intake appropriate for improving, restoring or maintaining nutritional needs  Description  Monitor and assess patient's nutrition/hydration status for malnutrition  Collaborate with interdisciplinary team and initiate plan and interventions as ordered  Monitor patient's weight and dietary intake as ordered or per policy  Utilize nutrition screening tool and intervene as necessary  Determine patient's food preferences and provide high-protein, high-caloric foods as appropriate       INTERVENTIONS:  - Monitor oral intake, urinary output, labs, and treatment plans  - Assess nutrition and hydration status and recommend course of action  - Evaluate amount of meals eaten  - Assist patient with eating if necessary   - Allow adequate time for meals  - Recommend/ encourage appropriate diets, oral nutritional supplements, and vitamin/mineral supplements  - Assess need for intravenous fluids  - Provide specific nutrition/hydration education as appropriate  - Include patient/family/caregiver in decisions related to nutrition   Outcome: Progressing     Problem: PAIN - ADULT  Goal: Verbalizes/displays adequate comfort level or baseline comfort level  Description  Interventions:  - Encourage patient to monitor pain and request assistance  - Assess pain using appropriate pain scale  - Administer analgesics based on type and severity of pain and evaluate response  - Implement non-pharmacological measures as appropriate and evaluate response  - Notify physician/advanced practitioner if interventions unsuccessful or patient reports new pain   Outcome: Progressing     Problem: INFECTION - ADULT  Goal: Absence or prevention of progression during hospitalization  Description  INTERVENTIONS:  - Assess and monitor for signs and symptoms of infection  - Monitor lab/diagnostic results  - Monitor all insertion sites, i e  indwelling lines, tubes, and drains  - Administer medications as ordered  - Instruct and encourage patient and family to use good hand hygiene technique  - Identify and instruct in appropriate isolation precautions for identified infection/condition   Outcome: Progressing     Problem: SAFETY ADULT  Goal: Patient will remain free of falls  Description  INTERVENTIONS:  - Assess patient frequently for physical needs  -  Identify cognitive and physical deficits and behaviors that affect risk of falls    -  [place] fall precautions as indicated by assessment   - Educate patient/family on patient safety including physical limitations  - Instruct patient to call for assistance with activity based on assessment  - Modify environment to reduce risk of injury  - Consider OT/PT consult to assist with strengthening/mobility  Outcome: Progressing  Goal: Maintain or return mobility status to optimal level  Description  INTERVENTIONS:  - Assess patient's baseline mobility status (ambulation, transfers, stairs, etc )    - Identify cognitive and physical deficits and behaviors that affect mobility  - Identify mobility aids required to assist with transfers and/or ambulation (gait belt, sit-to-stand, lift, walker, cane, etc )  - [place] fall precautions as indicated by assessment  - Record patient progress and toleration of activity level on Mobility SBAR; progress patient to next Phase/Stage  - Instruct patient to call for assistance with activity based on assessment  - Consider rehabilitation consult to assist with strengthening/weightbearing, etc   Outcome: Progressing     Problem: DISCHARGE PLANNING  Goal: Discharge to home or other facility with appropriate resources  Description  INTERVENTIONS:  - Identify barriers to discharge w/patient and caregiver  - Arrange for needed discharge resources and transportation as appropriate  - Identify discharge learning needs (meds, wound care, etc )  - Refer to Case Management Department for coordinating discharge planning if the patient needs post-hospital services based on physician/advanced practitioner order or complex needs related to functional status, cognitive ability, or social support system   Outcome: Progressing     Problem: Knowledge Deficit  Goal: Patient/family/caregiver demonstrates understanding of disease process, treatment plan, medications, and discharge instructions  Description  Complete learning assessment and assess knowledge base    Interventions:  - Provide teaching at level of understanding  - Provide teaching via preferred learning methods  Outcome: Progressing     Problem: NEUROSENSORY - ADULT  Goal: Achieves stable or improved neurological status  Description  INTERVENTIONS  - Monitor and report changes in neurological status  - Monitor vital signs such as temperature, blood pressure, glucose, and any other labs ordered   - Monitor for seizure activity and implement precautions if appropriate       Outcome: Progressing  Goal: Achieves maximal functionality and self care  Description  INTERVENTIONS  - Monitor swallowing and airway patency with patient fatigue and changes in neurological status  - Encourage and assist patient to increase activity and self care     - Encourage visually impaired, hearing impaired and aphasic patients to use assistive/communication devices  Outcome: Progressing     Problem: GENITOURINARY - ADULT  Goal: Maintains or returns to baseline urinary function  Description  INTERVENTIONS:  - Assess urinary function  - Encourage oral fluids to ensure adequate hydration if ordered  - Administer IV fluids as ordered to ensure adequate hydration  - Administer ordered medications as needed  - Offer frequent toileting  - Follow urinary retention protocol if ordered  Outcome: Progressing     Problem: METABOLIC, FLUID AND ELECTROLYTES - ADULT  Goal: Electrolytes maintained within normal limits  Description  INTERVENTIONS:  - Monitor labs and assess patient for signs and symptoms of electrolyte imbalances  - Administer electrolyte replacement as ordered  - Monitor response to electrolyte replacements, including repeat lab results as appropriate  - Instruct patient on fluid and nutrition as appropriate  Outcome: Progressing  Goal: Fluid balance maintained  Description  INTERVENTIONS:  - Monitor labs   - Monitor I/O and WT  - Instruct patient on fluid and nutrition as appropriate  - Assess for signs & symptoms of volume excess or deficit  Outcome: Progressing     Problem: SKIN/TISSUE INTEGRITY - ADULT  Goal: Skin integrity remains intact  Description  INTERVENTIONS  - Identify patients at risk for skin breakdown  - Assess and monitor skin integrity  - Assess and monitor nutrition and hydration status  - Monitor labs (i e  albumin)  - Assess for incontinence   - Turn and reposition patient  - Assist with mobility/ambulation  - Relieve pressure over bony prominences  - Avoid friction and shearing  - Provide appropriate hygiene as needed including keeping skin clean and dry  - Evaluate need for skin moisturizer/barrier cream  - Collaborate with interdisciplinary team (i e  Nutrition, Rehabilitation, etc )   - Patient/family teaching  Outcome: Progressing

## 2020-01-06 NOTE — ASSESSMENT & PLAN NOTE
· Blood pressure on admission 194/100  · Continue atenolol and  HCTZ, lisinopril was added  · Monitor

## 2020-01-07 VITALS
SYSTOLIC BLOOD PRESSURE: 134 MMHG | TEMPERATURE: 97 F | OXYGEN SATURATION: 96 % | DIASTOLIC BLOOD PRESSURE: 115 MMHG | HEIGHT: 55 IN | WEIGHT: 105 LBS | RESPIRATION RATE: 18 BRPM | HEART RATE: 51 BPM | BODY MASS INDEX: 24.3 KG/M2

## 2020-01-07 PROBLEM — R78.81 BACTEREMIA: Status: ACTIVE | Noted: 2020-01-07

## 2020-01-07 PROBLEM — R79.89 ELEVATED LACTIC ACID LEVEL: Status: RESOLVED | Noted: 2019-12-29 | Resolved: 2020-01-07

## 2020-01-07 PROBLEM — L21.9 SEBORRHEIC DERMATITIS OF SCALP: Status: ACTIVE | Noted: 2020-01-07

## 2020-01-07 PROCEDURE — 97530 THERAPEUTIC ACTIVITIES: CPT

## 2020-01-07 PROCEDURE — 99232 SBSQ HOSP IP/OBS MODERATE 35: CPT | Performed by: INTERNAL MEDICINE

## 2020-01-07 PROCEDURE — 99239 HOSP IP/OBS DSCHRG MGMT >30: CPT | Performed by: INTERNAL MEDICINE

## 2020-01-07 RX ORDER — CHOLECALCIFEROL (VITAMIN D3) 125 MCG
9000 CAPSULE ORAL
Qty: 90 TABLET | Refills: 0
Start: 2020-01-07 | End: 2020-01-07

## 2020-01-07 RX ORDER — LISINOPRIL 5 MG/1
5 TABLET ORAL DAILY
Qty: 30 TABLET | Refills: 0
Start: 2020-01-08 | End: 2020-01-13 | Stop reason: ALTCHOICE

## 2020-01-07 RX ORDER — POTASSIUM CHLORIDE 750 MG/1
10 TABLET, EXTENDED RELEASE ORAL DAILY
COMMUNITY

## 2020-01-07 RX ORDER — CHOLECALCIFEROL (VITAMIN D3) 125 MCG
9000 CAPSULE ORAL 3 TIMES DAILY PRN
Qty: 90 TABLET | Refills: 0
Start: 2020-01-07

## 2020-01-07 RX ADMIN — ENOXAPARIN SODIUM 30 MG: 30 INJECTION SUBCUTANEOUS at 09:45

## 2020-01-07 RX ADMIN — HYDROCHLOROTHIAZIDE 25 MG: 25 TABLET ORAL at 09:45

## 2020-01-07 RX ADMIN — LACTASE TAB 3000 UNIT 9000 UNITS: 3000 TAB at 09:48

## 2020-01-07 RX ADMIN — LOPERAMIDE HYDROCHLORIDE 2 MG: 2 CAPSULE ORAL at 09:46

## 2020-01-07 RX ADMIN — Medication 250 MG: at 09:45

## 2020-01-07 RX ADMIN — LISINOPRIL 5 MG: 5 TABLET ORAL at 09:53

## 2020-01-07 RX ADMIN — POTASSIUM CHLORIDE 10 MEQ: 750 TABLET, EXTENDED RELEASE ORAL at 09:45

## 2020-01-07 RX ADMIN — ASPIRIN 81 MG 81 MG: 81 TABLET ORAL at 09:46

## 2020-01-07 RX ADMIN — Medication 250 MG: at 17:02

## 2020-01-07 RX ADMIN — ZINC 1 TABLET: TAB ORAL at 09:47

## 2020-01-07 RX ADMIN — MELATONIN 1000 UNITS: at 09:45

## 2020-01-07 RX ADMIN — TIMOLOL MALEATE 1 DROP: 5 SOLUTION/ DROPS OPHTHALMIC at 09:47

## 2020-01-07 RX ADMIN — PRAVASTATIN SODIUM 20 MG: 20 TABLET ORAL at 17:02

## 2020-01-07 RX ADMIN — ATENOLOL 50 MG: 50 TABLET ORAL at 09:55

## 2020-01-07 RX ADMIN — LACTASE TAB 3000 UNIT 9000 UNITS: 3000 TAB at 17:02

## 2020-01-07 RX ADMIN — TIMOLOL MALEATE 1 DROP: 5 SOLUTION/ DROPS OPHTHALMIC at 17:03

## 2020-01-07 NOTE — SOCIAL WORK
Received phone call from Manjula Goldberg [address] , [facility] liaison, who confirmed that they have a bed available at [facility] for pt today  CM arranged S transport with [facility] for a 7 pm   Pt, pt's dgt Luis Alberto Dempsey - contact # [phone], pt's bedside RN Candie Carter, and Jana, [facility] liaison, made aware of same  Facility Agency Transfer form and CMN completed  Signed copy of CMN placed in pt's medical record  Chart copy requested

## 2020-01-07 NOTE — SOCIAL WORK
Contacted pt's Shady Sequeira, contact # [phone], to discuss pt's discharge plan  Informed her that [facility] not have bed availability  Informed her that the liaison from [address] [place] [address] would like to discuss the dc plan from rehab with her prior to offering a bed for pt's rehab and informed her that [facility] is able to accept and has bed availability for pt today  She stated that she will need to call  back to inform her of decision as she was currently in a doctor's office

## 2020-01-07 NOTE — RESTORATIVE TECHNICIAN NOTE
Restorative Specialist Mobility Note       Activity: Chair, Dangle, Stand at bedside(Educated/encouraged pt to ambulate with assistance 3-4 x's/day  Chair alarm on   Pt callbell, phone/tray within reach )     Assistive Device: Other (Comment)(HHA x2 OOB to the chair )      Eli Red BS, Restorative Technician, [employer]

## 2020-01-07 NOTE — PLAN OF CARE
Problem: Potential for Falls  Goal: Patient will remain free of falls  Description  INTERVENTIONS:  - Assess patient frequently for physical needs  -  Identify cognitive and physical deficits and behaviors that affect risk of falls  -  [place] fall precautions as indicated by assessment   - Educate patient/family on patient safety including physical limitations  - Instruct patient to call for assistance with activity based on assessment  - Modify environment to reduce risk of injury  - Consider OT/PT consult to assist with strengthening/mobility  Outcome: Progressing     Problem: Prexisting or High Potential for Compromised Skin Integrity  Goal: Skin integrity is maintained or improved  Description  INTERVENTIONS:  - Identify patients at risk for skin breakdown  - Assess and monitor skin integrity  - Assess and monitor nutrition and hydration status  - Monitor labs   - Assess for incontinence   - Turn and reposition patient  - Assist with mobility/ambulation  - Relieve pressure over bony prominences  - Avoid friction and shearing  - Provide appropriate hygiene as needed including keeping skin clean and dry  - Evaluate need for skin moisturizer/barrier cream  - Collaborate with interdisciplinary team   - Patient/family teaching  - Consider wound care consult   Outcome: Progressing     Problem: Nutrition/Hydration-ADULT  Goal: Nutrient/Hydration intake appropriate for improving, restoring or maintaining nutritional needs  Description  Monitor and assess patient's nutrition/hydration status for malnutrition  Collaborate with interdisciplinary team and initiate plan and interventions as ordered  Monitor patient's weight and dietary intake as ordered or per policy  Utilize nutrition screening tool and intervene as necessary  Determine patient's food preferences and provide high-protein, high-caloric foods as appropriate       INTERVENTIONS:  - Monitor oral intake, urinary output, labs, and treatment plans  - Assess nutrition and hydration status and recommend course of action  - Evaluate amount of meals eaten  - Assist patient with eating if necessary   - Allow adequate time for meals  - Recommend/ encourage appropriate diets, oral nutritional supplements, and vitamin/mineral supplements  - Assess need for intravenous fluids  - Provide specific nutrition/hydration education as appropriate  - Include patient/family/caregiver in decisions related to nutrition   Outcome: Progressing     Problem: PAIN - ADULT  Goal: Verbalizes/displays adequate comfort level or baseline comfort level  Description  Interventions:  - Encourage patient to monitor pain and request assistance  - Assess pain using appropriate pain scale  - Administer analgesics based on type and severity of pain and evaluate response  - Implement non-pharmacological measures as appropriate and evaluate response  - Notify physician/advanced practitioner if interventions unsuccessful or patient reports new pain   Outcome: Progressing     Problem: INFECTION - ADULT  Goal: Absence or prevention of progression during hospitalization  Description  INTERVENTIONS:  - Assess and monitor for signs and symptoms of infection  - Monitor lab/diagnostic results  - Monitor all insertion sites, i e  indwelling lines, tubes, and drains  - Administer medications as ordered  - Instruct and encourage patient and family to use good hand hygiene technique  - Identify and instruct in appropriate isolation precautions for identified infection/condition   Outcome: Progressing     Problem: SAFETY ADULT  Goal: Patient will remain free of falls  Description  INTERVENTIONS:  - Assess patient frequently for physical needs  -  Identify cognitive and physical deficits and behaviors that affect risk of falls    -  [place] fall precautions as indicated by assessment   - Educate patient/family on patient safety including physical limitations  - Instruct patient to call for assistance with activity based on assessment  - Modify environment to reduce risk of injury  - Consider OT/PT consult to assist with strengthening/mobility  Outcome: Progressing  Goal: Maintain or return mobility status to optimal level  Description  INTERVENTIONS:  - Assess patient's baseline mobility status (ambulation, transfers, stairs, etc )    - Identify cognitive and physical deficits and behaviors that affect mobility  - Identify mobility aids required to assist with transfers and/or ambulation (gait belt, sit-to-stand, lift, walker, cane, etc )  - [place] fall precautions as indicated by assessment  - Record patient progress and toleration of activity level on Mobility SBAR; progress patient to next Phase/Stage  - Instruct patient to call for assistance with activity based on assessment  - Consider rehabilitation consult to assist with strengthening/weightbearing, etc   Outcome: Progressing     Problem: DISCHARGE PLANNING  Goal: Discharge to home or other facility with appropriate resources  Description  INTERVENTIONS:  - Identify barriers to discharge w/patient and caregiver  - Arrange for needed discharge resources and transportation as appropriate  - Identify discharge learning needs (meds, wound care, etc )  - Refer to Case Management Department for coordinating discharge planning if the patient needs post-hospital services based on physician/advanced practitioner order or complex needs related to functional status, cognitive ability, or social support system   Outcome: Progressing     Problem: Knowledge Deficit  Goal: Patient/family/caregiver demonstrates understanding of disease process, treatment plan, medications, and discharge instructions  Description  Complete learning assessment and assess knowledge base    Interventions:  - Provide teaching at level of understanding  - Provide teaching via preferred learning methods  Outcome: Progressing     Problem: NEUROSENSORY - ADULT  Goal: Achieves stable or improved neurological status  Description  INTERVENTIONS  - Monitor and report changes in neurological status  - Monitor vital signs such as temperature, blood pressure, glucose, and any other labs ordered   - Monitor for seizure activity and implement precautions if appropriate       Outcome: Progressing  Goal: Achieves maximal functionality and self care  Description  INTERVENTIONS  - Monitor swallowing and airway patency with patient fatigue and changes in neurological status  - Encourage and assist patient to increase activity and self care     - Encourage visually impaired, hearing impaired and aphasic patients to use assistive/communication devices  Outcome: Progressing     Problem: GENITOURINARY - ADULT  Goal: Maintains or returns to baseline urinary function  Description  INTERVENTIONS:  - Assess urinary function  - Encourage oral fluids to ensure adequate hydration if ordered  - Administer IV fluids as ordered to ensure adequate hydration  - Administer ordered medications as needed  - Offer frequent toileting  - Follow urinary retention protocol if ordered  Outcome: Progressing     Problem: METABOLIC, FLUID AND ELECTROLYTES - ADULT  Goal: Electrolytes maintained within normal limits  Description  INTERVENTIONS:  - Monitor labs and assess patient for signs and symptoms of electrolyte imbalances  - Administer electrolyte replacement as ordered  - Monitor response to electrolyte replacements, including repeat lab results as appropriate  - Instruct patient on fluid and nutrition as appropriate  Outcome: Progressing  Goal: Fluid balance maintained  Description  INTERVENTIONS:  - Monitor labs   - Monitor I/O and WT  - Instruct patient on fluid and nutrition as appropriate  - Assess for signs & symptoms of volume excess or deficit  Outcome: Progressing     Problem: SKIN/TISSUE INTEGRITY - ADULT  Goal: Skin integrity remains intact  Description  INTERVENTIONS  - Identify patients at risk for skin breakdown  - Assess and monitor skin integrity  - Assess and monitor nutrition and hydration status  - Monitor labs (i e  albumin)  - Assess for incontinence   - Turn and reposition patient  - Assist with mobility/ambulation  - Relieve pressure over bony prominences  - Avoid friction and shearing  - Provide appropriate hygiene as needed including keeping skin clean and dry  - Evaluate need for skin moisturizer/barrier cream  - Collaborate with interdisciplinary team (i e  Nutrition, Rehabilitation, etc )   - Patient/family teaching  Outcome: Progressing

## 2020-01-07 NOTE — PHYSICAL THERAPY NOTE
Physical Therapy Progress Note     01/07/20 1412   Pain Assessment   Pain Assessment No/denies pain   Pain Score No Pain   Restrictions/Precautions   Weight Bearing Precautions Per Order No   Other Precautions Cognitive; Chair Alarm; Fall Risk   General   Family/Caregiver Present No   Cognition   Overall Cognitive Status Impaired   Arousal/Participation Alert; Responsive   Bed Mobility   Sit to Supine 2  Maximal assistance   Additional items Assist x 2   Transfers   Sit to Stand 2  Maximal assistance   Additional items Assist x 2;Verbal cues   Stand to Sit 2  Maximal assistance   Additional items Assist x 2   Stand pivot 2  Maximal assistance   Additional items Assist x 2   Balance   Static Standing Poor -   Dynamic Standing Poor -   Ambulatory Zero   Endurance Deficit   Endurance Deficit Yes   Endurance Deficit Description cognition   Activity Tolerance   Activity Tolerance Patient limited by fatigue   Nurse [address] to see per DARLEEN Samson   Assessment   Prognosis Fair   Problem List Decreased strength;Decreased endurance; Impaired balance;Decreased mobility; Decreased coordination;Decreased cognition; Impaired judgement;Decreased safety awareness   Assessment Pt was seen to assist nursing staff with transfer back to bed  Max assist of two required for sit to stand, stand pivot transfer from recliner to bed  Mali RN present to assist   Max assist of two for sit to supine  Bed alarm active post session  Pt would benefit from continued physical therapy to maximize functional independence  Goals   Patient Goals To go back to bed   STG Expiration Date 01/13/20   Short Term Goal #1 1  Pt will demonstrate ability to perform all aspects of bed mobility with Min A in order to increase independence and decrease burden on caregivers  2  Pt will demonstrate ability to perform functional transfers with Mind A in order to increase independence and decrease burden on caregivers    3  Pt will demonstrate ability to ambulate 25 ft with least restrictive AD with Min A in order to return to mobility safely  4  Pt will demonstrate improved balance by one grade order to decrease risk of falls  5  Pt will increase b/l LE strength by 1 grade in order to increase ease of functional mobility and transfers  PT Treatment Day 4   Plan   Treatment/Interventions Functional transfer training;LE strengthening/ROM; Therapeutic exercise; Endurance training;Cognitive reorientation;Patient/family training;Bed mobility;Spoke to nursing   Progress Slow progress, cognitive deficits   PT Frequency   (3-5x/week)   Recommendation   Recommendation Post acute IP rehab   Equipment Recommended Nyra Deal; Wheelchair  (RW)     Pennie Diaz, PTA

## 2020-01-07 NOTE — PLAN OF CARE
Problem: PHYSICAL THERAPY ADULT  Goal: Performs mobility at highest level of function for planned discharge setting  See evaluation for individualized goals  Description  Treatment/Interventions: Functional transfer training, LE strengthening/ROM, Endurance training, Therapeutic exercise, Patient/family training, Equipment eval/education, Bed mobility, Gait training, Spoke to nursing  Equipment Recommended: [place] Pilling, Wheelchair       See flowsheet documentation for full assessment, interventions and recommendations  Outcome: Not Progressing  Note:   Prognosis: Fair  Problem List: Decreased strength, Decreased endurance, Impaired balance, Decreased mobility, Decreased coordination, Decreased cognition, Impaired judgement, Decreased safety awareness  Assessment: Pt was seen to assist nursing staff with transfer back to bed  Max assist of two required for sit to stand, stand pivot transfer from recliner to bed  Mali RN present to assist   Max assist of two for sit to supine  Bed alarm active post session  Pt would benefit from continued physical therapy to maximize functional independence  Recommendation: Post acute IP rehab     PT - OK to Discharge: Yes(once medically cleared)    See flowsheet documentation for full assessment

## 2020-01-07 NOTE — DISCHARGE SUMMARY
Discharge Summary - [facility] Internal Medicine    Patient Information: Josemanuel Irby 80 y o  female MRN: [id number]  Unit/Bed#: [facility] 623-28 Encounter: [id number]    Discharging Physician / Practitioner: Jovany Chavez MD  PCP: No primary care provider on file  Admission Date: 12/29/2019  Discharge Date: 01/07/20    Principal discharge diagnoses:  1  Probable E  Coli urinary tract infection  2  Micrococcus luteus bacteremia versus contaminant  3  Seborrheic dermatitis of scalp    Secondary diagnoses:  Hypertension      Consultations During Hospital Stay:  Infectious disease    Procedures Performed:   1  Chest Xray - no acute cardiopulmonary disease  2  Xray left knee - no acute abnormality  3  CT head - no acute intracranial abnormality  4  CT cervical spine - No cervical spine fracture or malalignment  5  Echocardiogram -   LEFT VENTRICLE:  The cavity was small  Systolic function was normal  Ejection fraction was estimated to be 55 %  There were no regional wall motion abnormalities  Wall thickness was mildly increased  The changes were consistent with concentric remodeling (increased wall thickness with normal wall mass)  Features were consistent with a pseudonormal left ventricular filling pattern, with concomitant abnormal relaxation and increased filling pressure (grade 2 diastolic dysfunction)    RIGHT VENTRICLE:  The size was normal   Systolic function was normal    MITRAL VALVE:  There was mild to moderate annular calcification  There was mild stenosis  There was mild regurgitation    AORTIC VALVE:  Transaortic velocity was increased due to increased transvalvular flow  There was mild stenosis  There was mild to moderate regurgitation  Valve mean gradient was 9 4 mmHg  Estimated aortic valve area (by VTI) was 1 29 cmï¾²     TRICUSPID VALVE:  There was mild to moderate regurgitation    PULMONIC VALVE:  There was mild regurgitation  Hospital Course:     Josemanuel Irby is a 80 y  o  female patient history of hypertension, hyperlipidemia, glaucoma who originally presented to the hospital on 12/29/2019 trauma alert following a syncopal fall at home  She was febrile with a temperature of 101°F on presentation and had evidence of urinary tract infection and received a dose of cefepime  Antibiotics were changed to Ceftriaxone  Urine culture grew E  Coli and antibiotics were changed to Cefazolin  She has completed a 7 day course of antibiotics  1 of 2 blood cultures grew micrococcus luteus  She was evaluated and treated in consultation with infectious disease  Physical therapy recommended acute rehab and she is hence being discharged to [facility]  Condition at Discharge: stable     Discharge Day Visit / Exam:     * Please refer to separate progress for these details *    Discharge instructions/Information to patient and family:   See after visit summary for information provided to patient and family  Provisions for Follow-Up Care:  See after visit summary for information related to follow-up care and any pertinent home health orders  Disposition: Short-term rehab at     Planned Readmission: No    Discharge Statement:  I spent 40 minutes discharging the patient  This time was spent on the day of discharge  I had direct contact with the patient on the day of discharge  Greater than 50% of the total time was spent examining patient, answering all patient questions, arranging and discussing plan of care with patient as well as directly providing post-discharge instructions  Additional time then spent on discharge activities  Discharge Medications:  See after visit summary for reconciled discharge medications provided to patient and family

## 2020-01-07 NOTE — TRANSPORTATION MEDICAL NECESSITY
Section I - General Information    Name of Patient: Mervat Francois                 : 1925    Medicare #: [id number]  Transport Date: 20 (PCS is valid for round trips on this date and for all repetitive trips in the 60-day range as noted below )  Origin: [address] [facility] [address]                                                         Destination: [address]  Is the pt's stay covered under Medicare Part A (PPS/DRG)   []     Closest appropriate facility? If no, why is transport to more distant facility required? Yes  If hospice pt, is this transport related to pt's terminal illness? NA     Section II - Medical Necessity Questionnaire  Ambulance transportation is medically necessary only if other means of transport are contraindicated or would be potentially harmful to the patient  To meet this requirement, the patient must either be "bed confined" or suffer from a condition such that transport by means other than ambulance is contraindicated by the patient's condition  The following questions must be answered by the medical professional signing below for this form to be valid:    1)  Describe the MEDICAL CONDITION (physical and/or mental) of this patient AT [address] [place] [address] that requires the patient to be transported in an ambulance and why transport by other means is contraindicated by the patient's condition: fall risk; bed confined; max assist x2; non ambulatory; Sepsis; cognitive impairment; poor balance and safety awareness; impaired judgement; s/p fall    2) Is the patient "bed confined" as defined below? Yes  To be "be confined" the patient must satisfy all three of the following conditions: (1) unable to get up from bed without Assistance; AND (2) unable to ambulate; AND (3) unable to sit in a chair or wheelchair  3) Can this patient safely be transported by car or wheelchair van (i e , seated during transport without a medical attendant or monitoring)? No    4) In addition to completing questions 1-3 above, please check any of the following conditions that apply*:   *Note: supporting documentation for any boxes checked must be maintained in the patient's medical records  If hosp-hosp transfer, describe services needed at 2nd facility not available at 1st facility? Medical attendant required   Unable to tolerate seated position for time needed to transport   Other(specify) fall risk    Section III - Signature of Physician or Healthcare Professional  I certify that the above information is true and correct based on my evaluation of this patient, and represent that the patient requires transport by ambulance and that other forms of transport are contraindicated  I understand that this information will be used by the Centers for Medicare and Medicaid Services (CMS) to support the determination of medical necessity for ambulance services, and I represent that I have personal knowledge of the patient's condition at time of transport  []  If this box is checked, I also certify that the patient is physically or mentally incapable of signing the ambulance service's claim and that the institution with which I am affiliated has furnished care, services, or assistance to the patient  My signature below is made on behalf of the patient pursuant to 42 CFR §424 36(b)(4)  In accordance with 42 CFR §424 37, the specific reason(s) that the patient is physically or mentally incapable of signing the claim form is as follows:     Signature of Physician* or Healthcare Professional______________________________________________________  Signature Date 01/07/20 (For scheduled repetitive transports, this form is not valid for transports performed more than 60 days after this date)    Printed Name & Credentials of Physician or Healthcare Professional (MD, DO, RN, etc ) Jarad West MSVIVIANE    *Form must be signed by patient's attending physician for scheduled, repetitive transports  For non-repetitive, unscheduled ambulance transports, if unable to obtain the signature of the attending physician, any of the following may sign (choose appropriate option below)  [] Physician Assistant []  Clinical Nurse Specialist []  Registered Nurse  []  Nurse Practitioner  [x] Discharge Planner

## 2020-01-07 NOTE — SOCIAL WORK
Contacted pt's Marcus Kennedy, contact # [phone], to discuss pt's discharge plan  She stated that she spoke with the liaison from [facility] and she stated that the liaison informed her that they would have a bed available for pt and she stated that she accepted the bed at [facility]  Cm left message for Jana, [facility] liaison, to confirm that pt has been accepted and bed availability at [facility]

## 2020-01-07 NOTE — PLAN OF CARE
Problem: Potential for Falls  Goal: Patient will remain free of falls  Description  INTERVENTIONS:  - Assess patient frequently for physical needs  -  Identify cognitive and physical deficits and behaviors that affect risk of falls  -  [place] fall precautions as indicated by assessment   - Educate patient/family on patient safety including physical limitations  - Instruct patient to call for assistance with activity based on assessment  - Modify environment to reduce risk of injury  - Consider OT/PT consult to assist with strengthening/mobility  Outcome: Completed     Problem: Prexisting or High Potential for Compromised Skin Integrity  Goal: Skin integrity is maintained or improved  Description  INTERVENTIONS:  - Identify patients at risk for skin breakdown  - Assess and monitor skin integrity  - Assess and monitor nutrition and hydration status  - Monitor labs   - Assess for incontinence   - Turn and reposition patient  - Assist with mobility/ambulation  - Relieve pressure over bony prominences  - Avoid friction and shearing  - Provide appropriate hygiene as needed including keeping skin clean and dry  - Evaluate need for skin moisturizer/barrier cream  - Collaborate with interdisciplinary team   - Patient/family teaching  - Consider wound care consult   Outcome: Completed     Problem: Nutrition/Hydration-ADULT  Goal: Nutrient/Hydration intake appropriate for improving, restoring or maintaining nutritional needs  Description  Monitor and assess patient's nutrition/hydration status for malnutrition  Collaborate with interdisciplinary team and initiate plan and interventions as ordered  Monitor patient's weight and dietary intake as ordered or per policy  Utilize nutrition screening tool and intervene as necessary  Determine patient's food preferences and provide high-protein, high-caloric foods as appropriate       INTERVENTIONS:  - Monitor oral intake, urinary output, labs, and treatment plans  - Assess nutrition and hydration status and recommend course of action  - Evaluate amount of meals eaten  - Assist patient with eating if necessary   - Allow adequate time for meals  - Recommend/ encourage appropriate diets, oral nutritional supplements, and vitamin/mineral supplements  - Assess need for intravenous fluids  - Provide specific nutrition/hydration education as appropriate  - Include patient/family/caregiver in decisions related to nutrition   Outcome: Completed     Problem: PAIN - ADULT  Goal: Verbalizes/displays adequate comfort level or baseline comfort level  Description  Interventions:  - Encourage patient to monitor pain and request assistance  - Assess pain using appropriate pain scale  - Administer analgesics based on type and severity of pain and evaluate response  - Implement non-pharmacological measures as appropriate and evaluate response  - Notify physician/advanced practitioner if interventions unsuccessful or patient reports new pain   Outcome: Completed     Problem: INFECTION - ADULT  Goal: Absence or prevention of progression during hospitalization  Description  INTERVENTIONS:  - Assess and monitor for signs and symptoms of infection  - Monitor lab/diagnostic results  - Monitor all insertion sites, i e  indwelling lines, tubes, and drains  - Administer medications as ordered  - Instruct and encourage patient and family to use good hand hygiene technique  - Identify and instruct in appropriate isolation precautions for identified infection/condition   Outcome: Completed     Problem: SAFETY ADULT  Goal: Patient will remain free of falls  Description  INTERVENTIONS:  - Assess patient frequently for physical needs  -  Identify cognitive and physical deficits and behaviors that affect risk of falls    -  [place] fall precautions as indicated by assessment   - Educate patient/family on patient safety including physical limitations  - Instruct patient to call for assistance with activity based on assessment  - Modify environment to reduce risk of injury  - Consider OT/PT consult to assist with strengthening/mobility  Outcome: Completed  Goal: Maintain or return mobility status to optimal level  Description  INTERVENTIONS:  - Assess patient's baseline mobility status (ambulation, transfers, stairs, etc )    - Identify cognitive and physical deficits and behaviors that affect mobility  - Identify mobility aids required to assist with transfers and/or ambulation (gait belt, sit-to-stand, lift, walker, cane, etc )  - [place] fall precautions as indicated by assessment  - Record patient progress and toleration of activity level on Mobility SBAR; progress patient to next Phase/Stage  - Instruct patient to call for assistance with activity based on assessment  - Consider rehabilitation consult to assist with strengthening/weightbearing, etc   Outcome: Completed     Problem: DISCHARGE PLANNING  Goal: Discharge to home or other facility with appropriate resources  Description  INTERVENTIONS:  - Identify barriers to discharge w/patient and caregiver  - Arrange for needed discharge resources and transportation as appropriate  - Identify discharge learning needs (meds, wound care, etc )  - Refer to Case Management Department for coordinating discharge planning if the patient needs post-hospital services based on physician/advanced practitioner order or complex needs related to functional status, cognitive ability, or social support system   Outcome: Completed     Problem: Knowledge Deficit  Goal: Patient/family/caregiver demonstrates understanding of disease process, treatment plan, medications, and discharge instructions  Description  Complete learning assessment and assess knowledge base    Interventions:  - Provide teaching at level of understanding  - Provide teaching via preferred learning methods  Outcome: Completed     Problem: NEUROSENSORY - ADULT  Goal: Achieves stable or improved neurological status  Description  INTERVENTIONS  - Monitor and report changes in neurological status  - Monitor vital signs such as temperature, blood pressure, glucose, and any other labs ordered   - Monitor for seizure activity and implement precautions if appropriate       Outcome: Completed  Goal: Achieves maximal functionality and self care  Description  INTERVENTIONS  - Monitor swallowing and airway patency with patient fatigue and changes in neurological status  - Encourage and assist patient to increase activity and self care     - Encourage visually impaired, hearing impaired and aphasic patients to use assistive/communication devices  Outcome: Completed     Problem: GENITOURINARY - ADULT  Goal: Maintains or returns to baseline urinary function  Description  INTERVENTIONS:  - Assess urinary function  - Encourage oral fluids to ensure adequate hydration if ordered  - Administer IV fluids as ordered to ensure adequate hydration  - Administer ordered medications as needed  - Offer frequent toileting  - Follow urinary retention protocol if ordered  Outcome: Completed     Problem: METABOLIC, FLUID AND ELECTROLYTES - ADULT  Goal: Electrolytes maintained within normal limits  Description  INTERVENTIONS:  - Monitor labs and assess patient for signs and symptoms of electrolyte imbalances  - Administer electrolyte replacement as ordered  - Monitor response to electrolyte replacements, including repeat lab results as appropriate  - Instruct patient on fluid and nutrition as appropriate  Outcome: Completed  Goal: Fluid balance maintained  Description  INTERVENTIONS:  - Monitor labs   - Monitor I/O and WT  - Instruct patient on fluid and nutrition as appropriate  - Assess for signs & symptoms of volume excess or deficit  Outcome: Completed     Problem: SKIN/TISSUE INTEGRITY - ADULT  Goal: Skin integrity remains intact  Description  INTERVENTIONS  - Identify patients at risk for skin breakdown  - Assess and monitor skin integrity  - Assess and monitor nutrition and hydration status  - Monitor labs (i e  albumin)  - Assess for incontinence   - Turn and reposition patient  - Assist with mobility/ambulation  - Relieve pressure over bony prominences  - Avoid friction and shearing  - Provide appropriate hygiene as needed including keeping skin clean and dry  - Evaluate need for skin moisturizer/barrier cream  - Collaborate with interdisciplinary team (i e  Nutrition, Rehabilitation, etc )   - Patient/family teaching  Outcome: Completed

## 2020-01-08 NOTE — PROGRESS NOTES
Progress Note - Mya Nicole 1925, 80 y o  female MRN: [id number]    Unit/Bed#: [facility] 715-01 Encounter: [id number]    Primary Care Provider: No primary care provider on file  Date and time admitted to hospital: 2019  6:52 PM        Bacteremia  Assessment & Plan  · Micrococcus luteus - true bacteremia vs contaminant  · Cleared for discharge by ID  · Completed course of Abx    Seborrheic dermatitis of scalp  Assessment & Plan  · Selenium sulfide shampoo    HTN (hypertension)  Assessment & Plan  · Blood pressure on admission 194/100  · Continue atenolol and  HCTZ, lisinopril was added - improved  · Monitor      * Urinary tract infection  Assessment & Plan  · Secondary to E coli  · ID input appreciated  · History of C diff colitis  · Completed 7 day course of antibiotic            Patient Centered Rounds: I have performed bedside rounds with nursing staff today  Discussions with Specialists or Other Care Team Provider: Discussed with Dr Arnold Farnsworth    Education and Discussions with Family / Patient: Discussed with daughter Ja Escalante on the phone    Time Spent for Care: 20 minutes  More than 50% of total time spent on counseling and coordination of care as described above  Current Length of Stay: 9 day(s)    Current Patient Status: Inpatient     Discharge Plan:  Discharge home today    Subjective:   No new events  No diarrhea  No fever  Objective:     Vitals:   Temp (24hrs), Av 8 °F (36 6 °C), Min:97 °F (36 1 °C), Max:98 6 °F (37 °C)    Temp:  [97 °F (36 1 °C)-98 6 °F (37 °C)] 97 °F (36 1 °C)  HR:  [51-56] 51  Resp:  [18] 18  BP: (122-134)/() 134/115  SpO2:  [96 %] 96 %  Body mass index is 28 38 kg/m²  Physical Exam:     Physical Exam   HENT:   Head: Normocephalic and atraumatic  Eyes: Pupils are equal, round, and reactive to light  EOM are normal    Neck: Normal range of motion  Neck supple  Cardiovascular: Normal rate and regular rhythm     Pulmonary/Chest: Effort normal and breath sounds normal    Abdominal: Soft  Bowel sounds are normal    Musculoskeletal: She exhibits no edema  Neurological: She is alert  Skin: Skin is warm and dry  Psychiatric: She has a normal mood and affect  Additional Data:     Labs:    Results from last 7 days   Lab Units 01/06/20  0649   WBC Thousand/uL 4 86   HEMOGLOBIN g/dL 9 6*   HEMATOCRIT % 31 1*   PLATELETS Thousands/uL 155   NEUTROS PCT % 66   LYMPHS PCT % 19   MONOS PCT % 7   EOS PCT % 7*     Results from last 7 days   Lab Units 01/06/20  0507   SODIUM mmol/L 139   POTASSIUM mmol/L 4 6   CHLORIDE mmol/L 111*   CO2 mmol/L 22   BUN mg/dL 21   CREATININE mg/dL 0 82   ANION GAP mmol/L 6   CALCIUM mg/dL 9 5   GLUCOSE RANDOM mg/dL 73       * I Have Reviewed All Lab Data Listed Above  * Additional Pertinent Lab Tests Reviewed: [facility] Admission Reviewed       Today, Patient Was Seen By: Percy Gomez MD    ** Please Note: Dictation voice to text software may have been used in the creation of this document   **

## 2020-01-08 NOTE — ASSESSMENT & PLAN NOTE
· Micrococcus luteus - true bacteremia vs contaminant  · Cleared for discharge by ID  · Completed course of Abx

## 2020-01-08 NOTE — ASSESSMENT & PLAN NOTE
· Blood pressure on admission 194/100  · Continue atenolol and  HCTZ, lisinopril was added - improved  · Monitor

## 2020-01-08 NOTE — PROGRESS NOTES
Progress Note - Infectious Disease   Artie Wilcox 80 y o  female MRN: [id number]  Unit/Bed#: [facility] 715-01 Encounter: [id number]      Impression:  1  Probable E coli UTI  2  Micrococcus luteus bacteremia versus contaminant  3  Seborrheic dermatitis of scalp     Recommendations:  Bacteremia versus contaminant  Patient is afebrile  Blood culture 1 of 2 shows Micrococcus luteus  She is not confused this evening  No further diarrhea  1  Observe off antibiotics    E coli UTI  1  Cefazolin has been discontinued    Seborrheic dermatitis of scalp  1  Will need selenium sulfide shampoo as outpatient    Antibiotics:  1  None    Subjective:  I just want to sleep    Denies fevers, chills, or sweats  Denies nausea, vomiting,      Objective:  Vitals:  Temp:  [97 °F (36 1 °C)-98 6 °F (37 °C)] 97 °F (36 1 °C)  HR:  [51-56] 51  Resp:  [18] 18  BP: (122-158)/() 134/115  SpO2:  [96 %-97 %] 96 %  Temp (24hrs), Av 1 °F (36 7 °C), Min:97 °F (36 1 °C), Max:98 6 °F (37 °C)  Current: Temperature: (!) 97 °F (36 1 °C)    Physical Exam:     General Appearance:  Alert elderly chronically ill-appearing,  nontoxic with flat affect, no acute distress  Has seborrheic changes of  scalp and face   Throat: Oropharynx moist without lesions  Lips, mucosa, and tongue normal, many missing teeth   Neck: Supple, symmetrical, trachea midline, no adenopathy,  no tenderness/mass/nodules   Lungs:   Clear to auscultation bilaterally, no audible wheezes, rhonchi or rales; respirations unlabored   Heart:  Regular rate and rhythm, S1, S2 normal, grade 1/6 systolic murmur and grade 2/6 diastolic murmur, no rub or gallop   Abdomen:   Soft, non-tender, non-distended, positive bowel sounds    No masses, no organomegaly    No CVA tenderness   Extremities: Extremities normal, atraumatic, no clubbing, cyanosis or edema   Skin: Scalp and face with seborrheic dermatitis changes         Invasive Devices     None                 Labs, Imaging, & Other studies:   All pertinent labs were personally reviewed  Results from last 7 days   Lab Units 01/06/20  0649 01/04/20  0541 01/01/20  0624   WBC Thousand/uL 4 86 4 79 4 72   HEMOGLOBIN g/dL 9 6* 9 3* 9 6*   PLATELETS Thousands/uL 155 135* 126*     Results from last 7 days   Lab Units 01/06/20  0507 01/04/20  0541 01/02/20  0604   SODIUM mmol/L 139 140 141   POTASSIUM mmol/L 4 6 3 9 4 0   CHLORIDE mmol/L 111* 110* 113*   CO2 mmol/L 22 26 23   BUN mg/dL 21 14 15   CREATININE mg/dL 0 82 0 78 0 84   EGFR ml/min/1 73sq m 61 65 60   CALCIUM mg/dL 9 5 9 0 9 0

## 2020-01-09 ENCOUNTER — NURSING HOME VISIT (OUTPATIENT)
Dept: GERIATRICS | Facility: OTHER | Age: 85
End: 2020-01-09
Payer: MEDICARE

## 2020-01-09 DIAGNOSIS — R41.89 COGNITIVE IMPAIRMENT: ICD-10-CM

## 2020-01-09 DIAGNOSIS — I10 ESSENTIAL HYPERTENSION: ICD-10-CM

## 2020-01-09 DIAGNOSIS — H90.3 SENSORINEURAL HEARING LOSS (SNHL) OF BOTH EARS: ICD-10-CM

## 2020-01-09 DIAGNOSIS — E44.0 PROTEIN-CALORIE MALNUTRITION, MODERATE (HCC): ICD-10-CM

## 2020-01-09 DIAGNOSIS — N30.00 ACUTE CYSTITIS WITHOUT HEMATURIA: Primary | ICD-10-CM

## 2020-01-09 DIAGNOSIS — R26.2 AMBULATORY DYSFUNCTION: ICD-10-CM

## 2020-01-09 PROCEDURE — 99305 1ST NF CARE MODERATE MDM 35: CPT | Performed by: FAMILY MEDICINE

## 2020-01-09 NOTE — PROGRESS NOTES
[address]  [address]   [facility]  History and Physical    NAME: Tamanna Carr  AGE: 80 y o  SEX: female [id number]    DATE OF ENCOUNTER: 1/10/2020    Code status:  No CPR    Assessment and Plan     1  Acute cystitis without hematuria     - improved, s/p antibiotics     - encourage PO hydration  2  Ambulatory dysfunction     - PT/OT ordered     - Fall precautions    3  Essential hypertension     - cont Atenolol, ASA, HCTZ     - d/c Lisinopril, not taking at home  4  Protein-calorie malnutrition, moderate (HCC)     - nutritional supplements  - nutrition consult  5  Cognitive impairment     - supportive care  6  Sensorineural hearing loss (SNHL) of both ears     - supportive care  7  Lactose intolerance:      - restart Lactose tabs  - nutrition consult ordered  All medications and routine orders were reviewed and updated as needed  Plan discussed with: family  Chief Complaint     Seen for admission at [facility] [address]    History of Present Illness     Jorge Womack, a 79 y/o female admitted to the hospital with syncope and a fall  She found to have UTI, treated with cefepime, changed to cefazolin x 7 days  Her overall condition improved  PT evaluated the patient, recommended subacute rehab  She was seen and examined at bedside, stable  Daughter is next to her, provided most of the history  She lives in [facility] ([facility]  She needs max assistance with ADLs, uses walker  HISTORY:  History reviewed  No pertinent past medical history  Family History   Family history unknown: Yes     Social History     Socioeconomic History    Marital status:       Spouse name: None    Number of children: None    Years of education: None    Highest education level: None   Occupational History    None   Social Needs    Financial resource strain: None    Food insecurity:     Worry: None     Inability: None    Transportation needs:     Medical: None     Non-medical: None   Tobacco Use    Smoking status: Unknown If Ever Smoked    Smokeless tobacco: Never Used   Substance and Sexual Activity    Alcohol use: Not Currently    Drug use: Never    Sexual activity: None   Lifestyle    Physical activity:     Days per week: None     Minutes per session: None    Stress: None   Relationships    Social connections:     Talks on phone: None     Gets together: None     Attends Sabianism service: None     Active member of club or organization: None     Attends meetings of clubs or organizations: None     Relationship status: None    Intimate partner violence:     Fear of current or ex partner: None     Emotionally abused: None     Physically abused: None     Forced sexual activity: None   Other Topics Concern    None   Social History Narrative    None       Allergies: Allergies   Allergen Reactions    Iodine     Penicillins        Review of Systems     Review of Systems   Constitutional: Positive for fatigue  HENT: Positive for hearing loss  Genitourinary:        Urinary incontinence   Musculoskeletal: Positive for arthralgias and gait problem  Neurological: Positive for weakness  All other systems reviewed and are negative  As in HPI  Medications and orders     All medications reviewed and updated in Nursing Home EMR  Objective     Vitals: T: 98 2, P: 68, R: 16, BP: 136/55, 95% on RA, Wt: 90 lbs  Physical Exam   Constitutional: No distress  Elderly female, thin built   HENT:   Head: Normocephalic and atraumatic  Right Ear: External ear normal    Left Ear: External ear normal    Nose: Nose normal    Mouth/Throat: Oropharynx is clear and moist  No oropharyngeal exudate  Dry mucus membranes  Eyes: Pupils are equal, round, and reactive to light  Conjunctivae and EOM are normal  Right eye exhibits no discharge  Left eye exhibits no discharge  No scleral icterus  Neck: Normal range of motion   Neck supple  Cardiovascular: Normal rate and regular rhythm  Exam reveals no friction rub  Murmur heard  Pulmonary/Chest: Effort normal and breath sounds normal  No respiratory distress  She has no wheezes  She exhibits no tenderness  Abdominal: Soft  Bowel sounds are normal  She exhibits no distension  There is no tenderness  There is no guarding  Musculoskeletal: She exhibits tenderness  She exhibits no deformity  Severe impairment in ROM in all 4 extremities  Trace edema to LLE  Neurological: She is alert  No cranial nerve deficit  She exhibits normal muscle tone  Oriented to self  Verbal, pleasant, able to follow simple commands  Skin: Skin is warm and dry  She is not diaphoretic  There is erythema  Erythema in sacral area  Psychiatric: She has a normal mood and affect  Her behavior is normal    confused   Nursing note and vitals reviewed  Pertinent Laboratory/Diagnostic Studies: The following labs/studies were reviewed please see chart or hospital paperwork for details  Ref Range & Units 1/6/20 0649 1/4/20 0541 1/1/20 0624 12/31/19 0458    WBC 4 31 - 10 16 Thousand/uL 4 86  4 79  4 72  4 50    RBC 3 81 - 5 12 Million/uL 3 39Low   3 24Low   3 45Low   3 19Low     Hemoglobin 11 5 - 15 4 g/dL 9 6Low   9 3Low   9 6Low   9 1Low     Hematocrit 34 8 - 46 1 % 31 1Low   29 6Low   31 4Low   28 9Low     MCV 82 - 98 fL 92  91  91  91    MCH 26 8 - 34 3 pg 28 3  28 7  27 8  28 5    MCHC 31 4 - 37 4 g/dL 30 9Low   31 4  30 6Low   31 5    RDW 11 6 - 15 1 % 17  1High   16  8High   16  6High   16 7High     MPV 8 9 - 12 7 fL 10 7  10 4  10 8  11 1    Platelets 939 - 819 Thousands/uL 155  135Low   126Low   111Low     nRBC /100 WBCs 0  0  0  0    Neutrophils Relative 43 - 75 % 66  70  73  72    Immat GRANS % 0 - 2 % 0  0  0  0    Lymphocytes Relative 14 - 44 % 19  18  14  17    Monocytes Relative 4 - 12 % 7  7  8  6    Eosinophils Relative 0 - 6 % 7High   5  5  5    Basophils Relative 0 - 1 % 1  0  0  0 Neutrophils Absolute 1 85 - 7 62 Thousands/µL 3 25  3 33  3 41  3 21    Immature Grans Absolute 0 00 - 0 20 Thousand/uL 0 02  0 01  0 01  0 02    Lymphocytes Absolute 0 60 - 4 47 Thousands/µL 0 90  0 85  0 66  0 76    Monocytes Absolute 0 17 - 1 22 Thousand/µL 0 32  0 34  0 37  0 25    Eosinophils Absolute 0 00 - 0 61 Thousand/µL 0 34  0 24  0 25  0 24    Basophils Absolute 0 00 - 0 10 Thousands/µL 0 03  0 02  0 02  0 02      Ref Range & Units 1/6/20 0507    Sodium 136 - 145 mmol/L 139    Potassium 3 5 - 5 3 mmol/L 4 6    Comment: Slightly Hemolyzed; Results May be Affected   Chloride 100 - 108 mmol/L 111High     CO2 21 - 32 mmol/L 22    ANION GAP 4 - 13 mmol/L 6    BUN 5 - 25 mg/dL 21    Creatinine 0 60 - 1 30 mg/dL 0 82    Comment: Standardized to IDMS reference method   Glucose 65 - 140 mg/dL 73       Calcium 8 3 - 10 1 mg/dL 9 5    eGFR ml/min/1 73sq m 61      - Counseling Documentation: patient was counseled regarding: prognosis, with daughter

## 2020-01-10 PROBLEM — R26.2 AMBULATORY DYSFUNCTION: Status: ACTIVE | Noted: 2020-01-10

## 2020-01-10 PROBLEM — H90.3 SENSORINEURAL HEARING LOSS (SNHL) OF BOTH EARS: Status: ACTIVE | Noted: 2020-01-10

## 2020-01-10 PROBLEM — R41.89 COGNITIVE IMPAIRMENT: Status: ACTIVE | Noted: 2020-01-10

## 2020-01-13 ENCOUNTER — NURSING HOME VISIT (OUTPATIENT)
Dept: GERIATRICS | Facility: OTHER | Age: 85
End: 2020-01-13
Payer: MEDICARE

## 2020-01-13 DIAGNOSIS — R26.2 AMBULATORY DYSFUNCTION: ICD-10-CM

## 2020-01-13 DIAGNOSIS — I10 ESSENTIAL HYPERTENSION: ICD-10-CM

## 2020-01-13 DIAGNOSIS — E44.0 PROTEIN-CALORIE MALNUTRITION, MODERATE (HCC): ICD-10-CM

## 2020-01-13 DIAGNOSIS — R41.89 COGNITIVE IMPAIRMENT: Primary | ICD-10-CM

## 2020-01-13 PROCEDURE — 99308 SBSQ NF CARE LOW MDM 20: CPT | Performed by: PHYSICIAN ASSISTANT

## 2020-01-13 NOTE — ASSESSMENT & PLAN NOTE
BP log reviewed, stable  Lisinoprol d/c'd by Dr Ava Hays on 1/10/2020  Continue atenolol 25mg PO QD and HCTZ 25mg PO QD  Continue to monitor with BP checks at facility

## 2020-01-13 NOTE — PROGRESS NOTES
[facility]  [address]  [phone]  [place]   Code 31      NAME: Windy Gomez  AGE: 80 y o  SEX: female    DATE OF ENCOUNTER: 1/13/2020    Assessment and Plan     HTN (hypertension)  BP log reviewed, stable  Lisinoprol d/c'd by Dr Nicolasa Nguyen on 1/10/2020  Continue atenolol 25mg PO QD and HCTZ 25mg PO QD  Continue to monitor with BP checks at facility  Cognitive impairment  Continue redirection and reorientation at facility  Continue to monitor at facility  Continue fall precautions  Ambulatory dysfunction  Continue participation in PT/OT to improve functional status  Continue fall precautions  Protein-calorie malnutrition, moderate (HCC)  Continue to encourage PO intake  All medications and routine orders were reviewed and updated as needed  Chief Complaint     SNF Follow-up     History of Present Illness     OC is a 79 yo CF with multiple medical comorbidities including but not limited to s/p UTI, ambulatory dysfunction with falls, HTN, and cognitive impairment, interviewed and examined in collaboration with nursing for SNF follow-up  Pt denies pain at this time  Pt notes she is "eating the way I normally do " Pt notes staying well hydrated  Pt denies urinary symptoms  Pt notes having regular BMs  Pt notes she is sleeping well  Pt denies pain at this time  No concerns from nursing at this time  The patient's allergies, past medical, surgical, social and family history were reviewed and unchanged  Review of Systems     Review of Systems   Constitutional: Positive for activity change  Negative for chills and fever  HENT: Negative for sore throat  Respiratory: Negative for cough and shortness of breath  Cardiovascular: Negative for chest pain and palpitations  Gastrointestinal: Negative for abdominal distention, abdominal pain, diarrhea, nausea and vomiting  Genitourinary: Negative for difficulty urinating and dysuria  Musculoskeletal: Positive for gait problem  Neurological: Positive for weakness  Negative for dizziness, light-headedness and headaches  Psychiatric/Behavioral: Negative for suicidal ideas  Objective     Vitals: 136/76-65K-01bqk-18-95% RA    Physical Exam   Constitutional: No distress  Thin, frail appearing female wrapped in multiple blankets  HENT:   Head: Normocephalic and atraumatic  Nose: Nose normal    Mouth/Throat: No oropharyngeal exudate  Eyes: Pupils are equal, round, and reactive to light  Conjunctivae are normal  Right eye exhibits no discharge  Left eye exhibits no discharge  No scleral icterus  Cardiovascular: Normal rate and regular rhythm  Exam reveals no gallop and no friction rub  Murmur heard  Pulmonary/Chest: Effort normal and breath sounds normal  No stridor  No respiratory distress  She has no wheezes  She has no rales  Abdominal: Soft  Bowel sounds are normal  She exhibits no distension  There is no tenderness  There is no rebound and no guarding  Musculoskeletal: She exhibits edema (trace BLE) and tenderness  Limited ROM x 4 extremtities   Neurological: She is alert  Pt with confusion  Pt able to follow one step commands  Skin: She is not diaphoretic  Psychiatric:   Pleasant and cooperative during exam    Nursing note and vitals reviewed  Pertinent Laboratory/Diagnostic Studies:  Reviewed in facility chart     Current Medications   Medications reviewed and updated see facility chart for details        Current Outpatient Medications:     aspirin 81 mg chewable tablet, Chew 81 mg daily, Disp: , Rfl:     atenolol (TENORMIN) 25 mg tablet, Take 25 mg by mouth daily, Disp: , Rfl:     B Complex-C (B-COMPLEX WITH VITAMIN C) tablet, Take 1 tablet by mouth daily, Disp: , Rfl:     Calcium 600-400 MG-UNIT CHEW, Chew, Disp: , Rfl:     Ergocalciferol (VITAMIN D2 PO), Take 1 25 mg by mouth once a week On tuesday, Disp: , Rfl:     hydrochlorothiazide (HYDRODIURIL) 25 mg tablet, Take 25 mg by mouth daily, Disp: , Rfl:     lactase (LACTAID) 3,000 units tablet, Take 3 tablets (9,000 Units total) by mouth 3 (three) times a day as needed (As needed if consuming dairy products), Disp: 90 tablet, Rfl: 0    loperamide (IMODIUM) 2 mg capsule, Take 2 mg by mouth 4 (four) times a day as needed for diarrhea, Disp: , Rfl:     lovastatin (MEVACOR) 20 mg tablet, Take 20 mg by mouth daily at bedtime, Disp: , Rfl:     potassium chloride (K-DUR,KLOR-CON) 10 mEq tablet, Take 10 mEq by mouth daily , Disp: , Rfl:     selenium sulfide (SELSUN) 1 %, Apply topically 2 (two) times a week for 2 days, Disp: 118 mL, Rfl: 0    timolol (BETIMOL) 0 5 % ophthalmic solution, Administer 1 drop to both eyes 2 (two) times a day , Disp: , Rfl:       Robert Yee PA-C  1/13/2020 3:50 PM

## 2020-01-13 NOTE — ASSESSMENT & PLAN NOTE
Continue redirection and reorientation at facility  Continue to monitor at facility  Continue fall precautions

## 2020-01-17 ENCOUNTER — NURSING HOME VISIT (OUTPATIENT)
Dept: GERIATRICS | Facility: OTHER | Age: 85
End: 2020-01-17
Payer: MEDICARE

## 2020-01-17 DIAGNOSIS — E73.9 LACTOSE INTOLERANCE: Chronic | ICD-10-CM

## 2020-01-17 PROCEDURE — 99308 SBSQ NF CARE LOW MDM 20: CPT | Performed by: PHYSICIAN ASSISTANT

## 2020-01-18 PROBLEM — R21 RASH: Status: ACTIVE | Noted: 2020-01-18

## 2020-01-18 NOTE — ASSESSMENT & PLAN NOTE
Pt and pt's daughter approached provider very upset  Pt tearful and states that she usually takes 4 lactaid pills before each meal and "never has a blow out like this " Pt requested that her lactaid bottle be kept by her bedside  Discussed with pt and daughter that I did not feel that was safe  Discussed with pt and pt's daughter that maximum recommended amount of lactaid is 9,000 units per meal  Discussed with Dr Arlette Sifuentes  Agreed pt is allowed 4 tab lactaid before meals daily  Will re-evaluate on Monday  Discussed dairy restricted diet with pt

## 2020-01-18 NOTE — ASSESSMENT & PLAN NOTE
Pt notes excoriated area on her buttock  Pt states area burns at this time  She notes she had this while hospitalized and the hospital placed "some prescription cream that made it feel better " Discussed with Dr Rajiv Marin  Zinc oxide barrier cream AAA  Place padded border gauze to area  Will continue to monitor

## 2020-01-18 NOTE — PROGRESS NOTES
[facility]  [address]  [phone]  [place]   Code 31        NAME: Sabiha Chavez  AGE: 80 y o  SEX: female    DATE OF ENCOUNTER: 1/17/2020    Assessment and Plan     Lactose intolerance  Pt and pt's daughter approached provider very upset  Pt tearful and states that she usually takes 4 lactaid pills before each meal and "never has a blow out like this " Pt requested that her lactaid bottle be kept by her bedside  Discussed with pt and daughter that I did not feel that was safe  Discussed with pt and pt's daughter that maximum recommended amount of lactaid is 9,000 units per meal  Discussed with Dr Ashly Carlin  Agreed pt is allowed 4 tab lactaid before meals daily  Will re-evaluate on Monday  Discussed dairy restricted diet with pt  Rash  Pt notes excoriated area on her buttock  Pt states area burns at this time  She notes she had this while hospitalized and the hospital placed "some prescription cream that made it feel better " Discussed with Dr Ashly Carlin  Zinc oxide barrier cream AAA  Place padded border gauze to area  Will continue to monitor  All medications and routine orders were reviewed and updated as needed  Chief Complaint     "I've never had a blow-out like this before "    History of Present Illness     GR is a 81 yo CF with multiple medical comorbidities including but not limited to cognitive impairment, lactose intolerance and s/p UTI, interviewed and examined in collaboration with nursing for evaluation of buttock rash  Pt's daughter approached provider in [facility] tearful and distressed  Pt's daughter started yelling, "If you would've gotten the phone call I got, you would be like this too  You need to see my mom NOW " Provider DWN what the situation was and per nursing, pt had a large loose BM and noted that she has a rash on her buttock that started bothering her   Pt notes that she usually takes 4 lactaid tabs before each meal and she has only been getting 3 tabs QAC at the facility  Pt's daughter reports "the hospital was putting a prescription cream on my mother and she NEVER felt like this when she was there " Pt reports discomfort at the area of rash  She notes it is burning and denies pruritis  Rash   This is a new problem  The current episode started in the past 7 days  The problem has been gradually improving since onset  The affected locations include the left buttock and right buttock  The rash is characterized by redness  Associated with: pull-up  Associated symptoms include fatigue  Pertinent negatives include no anorexia, cough, diarrhea (loose stool), facial edema, fever, nail changes, shortness of breath, sore throat or vomiting  Treatments tried: "a prescription cream " The treatment provided moderate relief  (Recent uti)       The patient's allergies, past medical, surgical, social and family history were reviewed and unchanged  Review of Systems     Review of Systems   Constitutional: Positive for activity change and fatigue  Negative for chills and fever  HENT: Negative for sore throat  Respiratory: Negative for cough, choking and shortness of breath  Cardiovascular: Negative for chest pain and palpitations  Gastrointestinal: Negative for abdominal distention, abdominal pain, anorexia, diarrhea (loose stool), nausea and vomiting  Genitourinary: Negative for difficulty urinating and dysuria  Skin: Positive for rash  Negative for nail changes  Neurological: Positive for weakness  Psychiatric/Behavioral: Negative for suicidal ideas  Objective     Vitals: Reviewed in facility chart     Physical Exam   Constitutional: No distress  Abdominal: Soft  Bowel sounds are normal  She exhibits no distension  There is no tenderness  There is no rebound and no guarding  Neurological: She is alert  Skin: Rash noted  She is not diaphoretic  There is erythema     Psychiatric:   Cooperative with exam  Upset with daughter in the room but relaxed more when asked daughter to step out    Nursing note and vitals reviewed  Pertinent Laboratory/Diagnostic Studies:  Reviewed in facility chart     Current Medications   Medications reviewed and updated see facility chart for details      Adele Riggs PA-C  1/18/2020 10:20 AM

## 2020-01-20 ENCOUNTER — NURSING HOME VISIT (OUTPATIENT)
Dept: GERIATRICS | Facility: OTHER | Age: 85
End: 2020-01-20
Payer: MEDICARE

## 2020-01-20 DIAGNOSIS — E44.0 PROTEIN-CALORIE MALNUTRITION, MODERATE (HCC): ICD-10-CM

## 2020-01-20 DIAGNOSIS — R21 RASH: ICD-10-CM

## 2020-01-20 DIAGNOSIS — R26.2 AMBULATORY DYSFUNCTION: ICD-10-CM
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
## 2020-01-20 DIAGNOSIS — I10 ESSENTIAL HYPERTENSION: ICD-10-CM

## 2020-01-20 DIAGNOSIS — R41.89 COGNITIVE IMPAIRMENT: ICD-10-CM

## 2020-01-20 DIAGNOSIS — N30.00 ACUTE CYSTITIS WITHOUT HEMATURIA: Primary | ICD-10-CM

## 2020-01-20 PROCEDURE — 99309 SBSQ NF CARE MODERATE MDM 30: CPT | Performed by: PHYSICIAN ASSISTANT

## 2020-01-20 NOTE — ASSESSMENT & PLAN NOTE
Continue atenolol 25mg PO QD and HCTZ 25mg PO with potassium supplementation  Continue lovastatin 20mg PO QHS  Continue to monitor BP at facility

## 2020-01-20 NOTE — PROGRESS NOTES
[facility]  [address]  [phone]  [place]   Code 31        NAME: Loan Palmer  AGE: 80 y o  SEX: female    DATE OF ENCOUNTER: 1/20/2020    Assessment and Plan     HTN (hypertension)  Continue atenolol 25mg PO QD and HCTZ 25mg PO with potassium supplementation  Continue lovastatin 20mg PO QHS  Continue to monitor BP at facility  Rash  Resolving with zinc oxide AAA  Pt denies pain at this time  Continue zinc oxide and padded border gauze  Continue to monitor  Protein-calorie malnutrition, moderate (HCC)  Continue to encourage PO intake  Cognitive impairment  Continue redirection and reorientation  Continue to assist with ADLs at facility  Urinary tract infection  Pt's daughter, POA, notes that pt's baseline temperature is 97 1F  Pt's daughter requested temperature to be taken and was 98 1F  Pt's daughter reported that "any temperature she has over 98F is an infection  It always is  I am demanding a urine test " Discussed with pt, daughter, and nursing, that UA/C&S is not recommended since pt had recent UTI for which she was treated while hospitalized and that results will likely be skewed  Family persistent and demanding  UA/C&S ordered, awaiting results  All medications and routine orders were reviewed and updated as needed  Chief Complaint     "I am demanding a urine test for my mom "- Pt's daughter, POA     History of Present Illness     GR is a 79 yo CF with multiple medical comorbidities including but not limited to recent UTI, cognitive impairment, ambulatory dysfunction, dermatitis, lactose intolerance and HTN, interviewed and examined in collaboration with nursing for evaluation of "fever for my mom" x 1 day  Pt's daughter, POA, called demanding a urinary test due to "my mom is not herself  I demand her temperature to be taken now " Temperature 98 1F   Per POA, "that is a fever for my mom, and it means she has an infection somewhere " Pt evaluated  Pt without any complaints  Pt denies pain at this time  Pt notes she is eating well  Pt states she is drinking fluids  Pt notes irritation on buttocks is improving  Pt denies urinary and GI symptoms  No concerns from nursing  The patient's allergies, past medical, surgical, social and family history were reviewed and unchanged  Review of Systems     Review of Systems   Constitutional: Positive for activity change  Negative for chills and fever  HENT: Negative for sore throat  Respiratory: Negative for cough, shortness of breath and stridor  Cardiovascular: Negative for chest pain and palpitations  Gastrointestinal: Negative for abdominal distention, abdominal pain, diarrhea, nausea and vomiting  Genitourinary: Negative for difficulty urinating and dysuria  Musculoskeletal: Positive for arthralgias and gait problem  Skin: Positive for rash  Neurological: Positive for weakness  Negative for dizziness, light-headedness and headaches  Psychiatric/Behavioral: Negative for suicidal ideas  Objective     Vitals: 136/55-97 2F-68bpm-97 2lb-18-98%    Physical Exam   Constitutional: No distress  Thin, frail elderly female lying comfortably in bed  HENT:   Nose: Nose normal    Mouth/Throat: Oropharynx is clear and moist  No oropharyngeal exudate  Eyes: Pupils are equal, round, and reactive to light  Conjunctivae are normal  Right eye exhibits no discharge  Left eye exhibits no discharge  No scleral icterus  Cardiovascular: Normal rate and regular rhythm  Exam reveals no gallop and no friction rub  Murmur heard  Pulmonary/Chest: Effort normal and breath sounds normal  No stridor  No respiratory distress  She has no wheezes  She has no rales  Abdominal: Soft  Bowel sounds are normal  She exhibits no distension  There is no tenderness  There is no rebound and no guarding  Musculoskeletal: She exhibits no edema or tenderness     Able to move all 4 extremities Neurological: She is alert  With baseline confusion  Able to follow one step commands  Skin: She is not diaphoretic  Psychiatric:   Pleasant and cooperative during exam   Nursing note and vitals reviewed  Pertinent Laboratory/Diagnostic Studies:  Reviewed in facility chart     Current Medications   Medications reviewed and updated see facility chart for details      Jocelin John PA-C  1/20/2020 8:08 PM

## 2020-01-20 NOTE — ASSESSMENT & PLAN NOTE
Pt's daughter, POA, notes that pt's baseline temperature is 97 1F  Pt's daughter requested temperature to be taken and was 98 1F  Pt's daughter reported that "any temperature she has over 98F is an infection  It always is  I am demanding a urine test " Discussed with pt, daughter, and nursing, that UA/C&S is not recommended since pt had recent UTI for which she was treated while hospitalized and that results will likely be skewed  Family persistent and demanding  UA/C&S ordered, awaiting results  Per family request, prn acetaminophen given

## 2020-01-20 NOTE — ASSESSMENT & PLAN NOTE
Resolving with zinc oxide AAA  Pt denies pain at this time  Continue zinc oxide and padded border gauze  Continue to monitor

## 2020-01-21 ENCOUNTER — NURSING HOME VISIT (OUTPATIENT)
Dept: GERIATRICS | Facility: OTHER | Age: 85
End: 2020-01-21
Payer: MEDICARE

## 2020-01-21 DIAGNOSIS — I10 ESSENTIAL HYPERTENSION: ICD-10-CM

## 2020-01-21 DIAGNOSIS — E73.9 LACTOSE INTOLERANCE: Chronic | ICD-10-CM

## 2020-01-21 DIAGNOSIS — R41.89 COGNITIVE IMPAIRMENT: Primary | ICD-10-CM

## 2020-01-21 DIAGNOSIS — R21 RASH: ICD-10-CM

## 2020-01-21 DIAGNOSIS — R26.2 AMBULATORY DYSFUNCTION: ICD-10-CM

## 2020-01-21 DIAGNOSIS — N30.00 ACUTE CYSTITIS WITHOUT HEMATURIA: ICD-10-CM

## 2020-01-21 DIAGNOSIS — E44.0 PROTEIN-CALORIE MALNUTRITION, MODERATE (HCC): ICD-10-CM

## 2020-01-21 PROBLEM — H40.9 GLAUCOMA: Status: ACTIVE | Noted: 2020-01-21

## 2020-01-21 PROCEDURE — 99316 NF DSCHRG MGMT 30 MIN+: CPT | Performed by: PHYSICIAN ASSISTANT

## 2020-01-21 NOTE — PROGRESS NOTES
[facility]  [address]  [phone]  DISCHARGE SUMMARY- Code 31  [facility]: [facility]     NAME: Sabiha Chavez  AGE: 80 y o  SEX: female  DATE OF ADMISSION: 1/7/2020  DATE OF DISCHARGE: 1/23/2020  DISCHARGE DISPOSITION: [facility]     Reason for admission: Patient was admitted from [address] [place] [address] for rehabilitation after hospitalization for sepsis  Course of stay: Patient was admitted to [facility]  for rehabilitation due to physical deconditioning/ambulatory dysfunction due to sepsis  Significant events during the stay include one episode of loose BM, which pt's POA believes is due to pt not being on home regimen of lactaid, which has since resolved  The patient participated in PT/OT  Yanira Teran is a 81 yo CF with multiple medical comorbidities including but not limited to cognitive impairment, HTN, lactose intolerance, and sepsis, interviewed and examined in collaboration with nursing for SNF discharge  Pt's history is limited due to dementia/cognitive impairment  Pt denies pain at this time  Pt notes she is eating at her normal level and doing her best to stay hydrated  Pt denies GI and urinary symptoms  No concerns from nursing  ROS:  Review of Systems   Constitutional: Positive for activity change  Negative for chills and fever  HENT: Negative for sore throat  Respiratory: Negative for cough and shortness of breath  Cardiovascular: Negative for chest pain and palpitations  Gastrointestinal: Negative for abdominal distention, abdominal pain, diarrhea, nausea and vomiting  Genitourinary: Negative for difficulty urinating and dysuria  Musculoskeletal: Positive for arthralgias and gait problem  Skin: Positive for rash  Neurological: Positive for weakness  Negative for dizziness, light-headedness and headaches  Psychiatric/Behavioral: Negative for suicidal ideas         PHYSICAL EXAM:  Vitals:  459/85-61 9G-81 bpm- 97 2lb-18    Physical Exam   Constitutional: No distress  Thin, frail elderly female lying comfortably in bed    HENT:   Head: Normocephalic and atraumatic  Nose: Nose normal    Mouth/Throat: Oropharynx is clear and moist  No oropharyngeal exudate  Eyes: Pupils are equal, round, and reactive to light  Conjunctivae are normal  Right eye exhibits no discharge  Left eye exhibits no discharge  No scleral icterus  Cardiovascular: Normal rate and regular rhythm  Exam reveals no gallop and no friction rub  No murmur heard  Pulmonary/Chest: Effort normal and breath sounds normal  No stridor  No respiratory distress  She has no wheezes  She has no rales  Abdominal: Soft  Bowel sounds are normal  She exhibits no distension  There is no tenderness  There is no rebound and no guarding  Musculoskeletal: She exhibits no edema or tenderness  Neurological: She is alert  With confusion  Able to follow one step commands    Skin: She is not diaphoretic  Psychiatric:   Pleasant and cooperative during exam    Nursing note and vitals reviewed  Admission Diagnoses: acute cystitis, ambulatory dysfunction, HTN, protein calorie malnutrition, cognitive impairment, SNHL, lactose intolerance     Discharge Diagnoses:   HTN (hypertension)  BP log reviewed, stable  Continue atenolol 25mg PO QD and HCTZ 25mg PO QD with potassium supplementation  Continue lovastatin 20mg PO QD  Continue ASA 81 mg PO QD  Continue to monitor BP with PCP  Rash  Continue zinc oxide cream with padded gauze barrier AAA  Continue to monitor at [facility]  Lactose intolerance  Discharged to [facility] on recommended daily maximum of lactaid, 9000 U PO QAC  Continue to monitor at [facility]  Continue dairy restricted diet  Protein-calorie malnutrition, moderate (HCC)  Continue to encourage PO intake  Ambulatory dysfunction  Continue participation with PT at [facility]  Continue fall precautions at [facility]       Cognitive impairment  Continue redirection and reorientation  Continue to assist with ADLs at [facility]  Glaucoma  Continue timolol sol 0 5% in each eye BID  Continue to follow with ophthalmology  Urinary tract infection  Resolved  U/A 1/21/2020 negative for infection  Follow-up Recommendations: PCP    Labs and testing performed during stay:  BIMS: 11    U/A 1/21/2020  Specific gravity 1 013  PH 6  Protein negative   Glucose negative   Ketone negative   Leukocyte  Negative   Nitrite negative     BMP 1/13/2020  Glucose 75  BUN 35  Creatinine 0 95  Sodium 145  Potassium 4 6  GFR 51    New Medications: None       Discharge Medications: See discharge medication list which was reviewed and signed        Current Outpatient Medications:     aspirin 81 mg chewable tablet, Chew 81 mg daily, Disp: , Rfl:     atenolol (TENORMIN) 25 mg tablet, Take 25 mg by mouth daily, Disp: , Rfl:     B Complex-C (B-COMPLEX WITH VITAMIN C) tablet, Take 1 tablet by mouth daily, Disp: , Rfl:     Calcium 600-400 MG-UNIT CHEW, Chew, Disp: , Rfl:     Ergocalciferol (VITAMIN D2 PO), Take 1 25 mg by mouth once a week On tuesday, Disp: , Rfl:     hydrochlorothiazide (HYDRODIURIL) 25 mg tablet, Take 25 mg by mouth daily, Disp: , Rfl:     lactase (LACTAID) 3,000 units tablet, Take 3 tablets (9,000 Units total) by mouth 3 (three) times a day as needed (As needed if consuming dairy products), Disp: 90 tablet, Rfl: 0    loperamide (IMODIUM) 2 mg capsule, Take 2 mg by mouth 4 (four) times a day as needed for diarrhea, Disp: , Rfl:     lovastatin (MEVACOR) 20 mg tablet, Take 20 mg by mouth daily at bedtime, Disp: , Rfl:     potassium chloride (K-DUR,KLOR-CON) 10 mEq tablet, Take 10 mEq by mouth daily , Disp: , Rfl:     selenium sulfide (SELSUN) 1 %, Apply topically 2 (two) times a week for 2 days, Disp: 118 mL, Rfl: 0    timolol (BETIMOL) 0 5 % ophthalmic solution, Administer 1 drop to both eyes 2 (two) times a day , Disp: , Rfl:       Discussion with patient/family and further instructions:  -Fall precautions  -Aspiration precautions  -Bleeding precautions  -Monitor for signs/symptoms of infection  -Medication list was reviewed and signed  -DME form was completed    Status at time of discharge: Stable    Billing based on time  Time spent on unit, 40 minutes  Time spent counseling pt on debility/condition, 30 minutes       Dennis Hawley PA-C  2/59/31999:94 PM

## 2020-01-21 NOTE — ASSESSMENT & PLAN NOTE
Discharged to [facility] on recommended daily maximum of lactaid, 9000 U PO QAC  Continue to monitor at [facility]  Continue dairy restricted diet

## 2020-01-21 NOTE — ASSESSMENT & PLAN NOTE
BP log reviewed, stable  Continue atenolol 25mg PO QD and HCTZ 25mg PO QD with potassium supplementation  Continue lovastatin 20mg PO QD  Continue ASA 81 mg PO QD  Continue to monitor BP with PCP

## 2020-01-21 NOTE — ASSESSMENT & PLAN NOTE
Continue participation with PT at [facility]  Continue fall precautions at [facility]

## 2022-01-14 NOTE — RESTORATIVE TECHNICIAN NOTE
Restorative Specialist Mobility Note       Activity: Other (Comment)(Educated/encouraged pt to get In/OOB with assistance, pt refused RN Jenifer aware  Bed alarm on  Pt callbell, phone/tray within reach )        Repositioned:  Other (Comment)(Rep /sat pt upright in bed )    Shama ZAMBRANO, Restorative Technician, [employer] details… Soft, non-tender, no hepatosplenomegaly, normal bowel sounds

## 2025-08-07 NOTE — ASSESSMENT & PLAN NOTE
Contacted patient regarding prior auth. Patient was agreeable to switching pharmacy to [facility] for Propanolol.    Malnutrition Findings:        muscle wasting    BMI Findings: Body mass index is 28 38 kg/m²     · Patient is 47 7 kilos, low albumin